# Patient Record
Sex: MALE | Race: WHITE | NOT HISPANIC OR LATINO | Employment: UNEMPLOYED | URBAN - NONMETROPOLITAN AREA
[De-identification: names, ages, dates, MRNs, and addresses within clinical notes are randomized per-mention and may not be internally consistent; named-entity substitution may affect disease eponyms.]

---

## 2017-07-25 ENCOUNTER — HOSPITAL ENCOUNTER (INPATIENT)
Facility: HOSPITAL | Age: 32
LOS: 1 days | Discharge: HOME | End: 2017-07-26
Attending: NEUROLOGICAL SURGERY | Admitting: NEUROLOGICAL SURGERY

## 2017-07-25 ENCOUNTER — APPOINTMENT (OUTPATIENT)
Dept: CT IMAGING | Facility: HOSPITAL | Age: 32
End: 2017-07-25

## 2017-07-25 DIAGNOSIS — I60.9 SUBARACHNOID BLEED (HCC): ICD-10-CM

## 2017-07-25 LAB
APTT PPP: 27.6 SECONDS (ref 24.1–34.8)
BACTERIA UR QL AUTO: ABNORMAL /HPF
BILIRUB UR QL STRIP: NEGATIVE
CLARITY UR: CLEAR
COLOR UR: YELLOW
GLUCOSE UR STRIP-MCNC: NEGATIVE MG/DL
HGB UR QL STRIP.AUTO: NEGATIVE
HYALINE CASTS UR QL AUTO: ABNORMAL /LPF
INR PPP: 0.92 (ref 0.91–1.09)
KETONES UR QL STRIP: NEGATIVE
LEUKOCYTE ESTERASE UR QL STRIP.AUTO: ABNORMAL
NITRITE UR QL STRIP: NEGATIVE
PH UR STRIP.AUTO: 5.5 [PH] (ref 5–8)
PROT UR QL STRIP: NEGATIVE
PROTHROMBIN TIME: 12.6 SECONDS (ref 11.9–14.6)
RBC # UR: ABNORMAL /HPF
REF LAB TEST METHOD: ABNORMAL
SP GR UR STRIP: 1.02 (ref 1–1.03)
SQUAMOUS #/AREA URNS HPF: ABNORMAL /HPF
UROBILINOGEN UR QL STRIP: ABNORMAL
WBC UR QL AUTO: ABNORMAL /HPF
YEAST URNS QL MICRO: ABNORMAL /HPF

## 2017-07-25 PROCEDURE — 81001 URINALYSIS AUTO W/SCOPE: CPT | Performed by: NEUROLOGICAL SURGERY

## 2017-07-25 PROCEDURE — G8980 MOBILITY D/C STATUS: HCPCS | Performed by: PHYSICAL THERAPIST

## 2017-07-25 PROCEDURE — 97165 OT EVAL LOW COMPLEX 30 MIN: CPT

## 2017-07-25 PROCEDURE — G8988 SELF CARE GOAL STATUS: HCPCS

## 2017-07-25 PROCEDURE — 85730 THROMBOPLASTIN TIME PARTIAL: CPT | Performed by: NEUROLOGICAL SURGERY

## 2017-07-25 PROCEDURE — G8978 MOBILITY CURRENT STATUS: HCPCS | Performed by: PHYSICAL THERAPIST

## 2017-07-25 PROCEDURE — 99255 IP/OBS CONSLTJ NEW/EST HI 80: CPT | Performed by: PSYCHIATRY & NEUROLOGY

## 2017-07-25 PROCEDURE — 81003 URINALYSIS AUTO W/O SCOPE: CPT | Performed by: NEUROLOGICAL SURGERY

## 2017-07-25 PROCEDURE — 25010000002 DIHYDROERGOTAMINE MESYLATE PER 1 MG: Performed by: PSYCHIATRY & NEUROLOGY

## 2017-07-25 PROCEDURE — 25010000002 ONDANSETRON PER 1 MG: Performed by: PSYCHIATRY & NEUROLOGY

## 2017-07-25 PROCEDURE — 0 IOPAMIDOL PER 1 ML: Performed by: NEUROLOGICAL SURGERY

## 2017-07-25 PROCEDURE — 87086 URINE CULTURE/COLONY COUNT: CPT | Performed by: NEUROLOGICAL SURGERY

## 2017-07-25 PROCEDURE — 70496 CT ANGIOGRAPHY HEAD: CPT

## 2017-07-25 PROCEDURE — G8987 SELF CARE CURRENT STATUS: HCPCS

## 2017-07-25 PROCEDURE — 85610 PROTHROMBIN TIME: CPT | Performed by: NEUROLOGICAL SURGERY

## 2017-07-25 PROCEDURE — G8989 SELF CARE D/C STATUS: HCPCS

## 2017-07-25 PROCEDURE — G8979 MOBILITY GOAL STATUS: HCPCS | Performed by: PHYSICAL THERAPIST

## 2017-07-25 PROCEDURE — 99223 1ST HOSP IP/OBS HIGH 75: CPT | Performed by: NEUROLOGICAL SURGERY

## 2017-07-25 PROCEDURE — 97161 PT EVAL LOW COMPLEX 20 MIN: CPT

## 2017-07-25 PROCEDURE — 25010000002 HYDROMORPHONE PER 4 MG: Performed by: NEUROLOGICAL SURGERY

## 2017-07-25 RX ORDER — TRAMADOL HYDROCHLORIDE 50 MG/1
50 TABLET ORAL EVERY 6 HOURS PRN
Status: DISCONTINUED | OUTPATIENT
Start: 2017-07-25 | End: 2017-07-26 | Stop reason: HOSPADM

## 2017-07-25 RX ORDER — BISACODYL 10 MG
10 SUPPOSITORY, RECTAL RECTAL DAILY PRN
Status: DISCONTINUED | OUTPATIENT
Start: 2017-07-25 | End: 2017-07-26 | Stop reason: HOSPADM

## 2017-07-25 RX ORDER — SODIUM CHLORIDE 9 MG/ML
75 INJECTION, SOLUTION INTRAVENOUS CONTINUOUS
Status: DISCONTINUED | OUTPATIENT
Start: 2017-07-25 | End: 2017-07-26 | Stop reason: HOSPADM

## 2017-07-25 RX ORDER — ACETAMINOPHEN 650 MG/1
650 SUPPOSITORY RECTAL EVERY 4 HOURS PRN
Status: DISCONTINUED | OUTPATIENT
Start: 2017-07-25 | End: 2017-07-26 | Stop reason: HOSPADM

## 2017-07-25 RX ORDER — SODIUM CHLORIDE 0.9 % (FLUSH) 0.9 %
1-10 SYRINGE (ML) INJECTION AS NEEDED
Status: DISCONTINUED | OUTPATIENT
Start: 2017-07-25 | End: 2017-07-26 | Stop reason: HOSPADM

## 2017-07-25 RX ORDER — SENNA AND DOCUSATE SODIUM 50; 8.6 MG/1; MG/1
2 TABLET, FILM COATED ORAL 2 TIMES DAILY
Status: DISCONTINUED | OUTPATIENT
Start: 2017-07-25 | End: 2017-07-26 | Stop reason: HOSPADM

## 2017-07-25 RX ORDER — ONDANSETRON 2 MG/ML
4 INJECTION INTRAMUSCULAR; INTRAVENOUS EVERY 8 HOURS
Status: DISCONTINUED | OUTPATIENT
Start: 2017-07-25 | End: 2017-07-26 | Stop reason: HOSPADM

## 2017-07-25 RX ORDER — ONDANSETRON 2 MG/ML
4 INJECTION INTRAMUSCULAR; INTRAVENOUS EVERY 6 HOURS PRN
Status: DISCONTINUED | OUTPATIENT
Start: 2017-07-25 | End: 2017-07-26 | Stop reason: HOSPADM

## 2017-07-25 RX ORDER — NALOXONE HCL 0.4 MG/ML
0.4 VIAL (ML) INJECTION
Status: DISCONTINUED | OUTPATIENT
Start: 2017-07-25 | End: 2017-07-26 | Stop reason: HOSPADM

## 2017-07-25 RX ORDER — HYDROMORPHONE HCL 110MG/55ML
2 PATIENT CONTROLLED ANALGESIA SYRINGE INTRAVENOUS ONCE
Status: COMPLETED | OUTPATIENT
Start: 2017-07-25 | End: 2017-07-25

## 2017-07-25 RX ORDER — SODIUM CHLORIDE 9 MG/ML
125 INJECTION, SOLUTION INTRAVENOUS CONTINUOUS
Status: DISCONTINUED | OUTPATIENT
Start: 2017-07-25 | End: 2017-07-26 | Stop reason: HOSPADM

## 2017-07-25 RX ORDER — KETOROLAC TROMETHAMINE 30 MG/ML
30 INJECTION, SOLUTION INTRAMUSCULAR; INTRAVENOUS EVERY 6 HOURS PRN
Status: DISCONTINUED | OUTPATIENT
Start: 2017-07-25 | End: 2017-07-26 | Stop reason: HOSPADM

## 2017-07-25 RX ADMIN — IOPAMIDOL 150 ML: 755 INJECTION, SOLUTION INTRAVENOUS at 09:00

## 2017-07-25 RX ADMIN — ONDANSETRON 4 MG: 2 INJECTION INTRAMUSCULAR; INTRAVENOUS at 11:55

## 2017-07-25 RX ADMIN — SODIUM CHLORIDE 125 ML/HR: 9 INJECTION, SOLUTION INTRAVENOUS at 11:47

## 2017-07-25 RX ADMIN — HYDROMORPHONE HYDROCHLORIDE 1 MG: 1 INJECTION, SOLUTION INTRAMUSCULAR; INTRAVENOUS; SUBCUTANEOUS at 14:25

## 2017-07-25 RX ADMIN — SODIUM CHLORIDE 125 ML/HR: 9 INJECTION, SOLUTION INTRAVENOUS at 15:37

## 2017-07-25 RX ADMIN — HYDROMORPHONE HYDROCHLORIDE 1 MG: 1 INJECTION, SOLUTION INTRAMUSCULAR; INTRAVENOUS; SUBCUTANEOUS at 11:56

## 2017-07-25 RX ADMIN — DIHYDROERGOTAMINE MESYLATE 0.5 MG: 1 INJECTION, SOLUTION INTRAMUSCULAR; INTRAVENOUS; SUBCUTANEOUS at 11:55

## 2017-07-25 RX ADMIN — HYDROMORPHONE HYDROCHLORIDE 1 MG: 1 INJECTION, SOLUTION INTRAMUSCULAR; INTRAVENOUS; SUBCUTANEOUS at 20:51

## 2017-07-25 RX ADMIN — HYDROMORPHONE HYDROCHLORIDE 2 MG: 2 INJECTION, SOLUTION INTRAMUSCULAR; INTRAVENOUS; SUBCUTANEOUS at 07:23

## 2017-07-25 RX ADMIN — ONDANSETRON 4 MG: 2 INJECTION INTRAMUSCULAR; INTRAVENOUS at 20:51

## 2017-07-25 RX ADMIN — DOCUSATE SODIUM -SENNOSIDES 2 TABLET: 50; 8.6 TABLET, COATED ORAL at 17:53

## 2017-07-25 RX ADMIN — HYDROMORPHONE HYDROCHLORIDE 1 MG: 1 INJECTION, SOLUTION INTRAMUSCULAR; INTRAVENOUS; SUBCUTANEOUS at 16:26

## 2017-07-25 RX ADMIN — HYDROMORPHONE HYDROCHLORIDE 1 MG: 1 INJECTION, SOLUTION INTRAMUSCULAR; INTRAVENOUS; SUBCUTANEOUS at 10:01

## 2017-07-25 NOTE — PLAN OF CARE
Problem: Patient Care Overview (Adult)  Goal: Plan of Care Review  Outcome: Ongoing (interventions implemented as appropriate)    07/25/17 1411   Coping/Psychosocial Response Interventions   Plan Of Care Reviewed With patient   Patient Care Overview   Progress progress toward functional goals as expected   Outcome Evaluation   Outcome Summary/Follow up Plan OT eval completed. Pt. cond I for bed mobility. Pt. SBA for sit to stand t/f and functional mobility. Pt. independent with donning socks. Pt. reports h/a is worsening with light and activity. Pt. does not require skilled OT at this time d/t being at baseline other than severe headache. 7/25/17 1346

## 2017-07-25 NOTE — PROGRESS NOTES
Discharge Planning Assessment  [facility]     Patient Name: Lior Piper  MRN: [id number]  Today's Date: 7/25/2017    Admit Date: 7/25/2017          Discharge Needs Assessment       07/25/17 1038    Living Environment    Lives With spouse    Living Arrangements apartment    Transportation Available car;family or friend will provide    Living Environment    Provides Primary Care For no one    Quality Of Family Relationships supportive    Able to Return to Prior Living Arrangements yes    Discharge Needs Assessment    Concerns To Be Addressed financial/insurance concerns    Readmission Within The Last 30 Days no previous admission in last 30 days    Anticipated Changes Related to Illness none    Equipment Currently Used at Home none    Equipment Needed After Discharge none    Current Discharge Risk financial support inadequate    Discharge Planning Comments Patient admitted from home where he resides with his spouse.  Patient is independent and works.  Patient does not have medical coverage, prescription coverage or a PCP.  Patient states he has had no chronic health issues.  Patient prefers to take information on [facility] with him and would not like an appointment scheduled by hospital at this time for follow up.  Patient states he will pursue PCP follow up independently.  Patient may require assistance with discharge medications.  Did inform patient that [facility] will be screening him if they haven't already and if he does not qualify for any state insurance programs to utilize the hospital's alisha program.            Discharge Plan     None        Discharge Placement     No information found                Demographic Summary     None            Functional Status     None            Psychosocial     None            Abuse/Neglect     None            Legal     None            Substance Abuse     None            Patient Forms     None          GRIFFIN Yuen

## 2017-07-25 NOTE — H&P
Date of Admission: 7/25/2017  Primary Care Physician: No Known Provider        Subjective:    Chief complaint headache    HPI: 33 yo male with a three day history of headache, became severe Monday morning. Photophobia, nausea, no vomiting. Some neck stiffness. No vision changes.  Patient normally does not have headaches.  He has never sought medical attention for headaches in the past.    Review of Systems   Neurological: Positive for headaches.   All other systems reviewed and are negative.       Past Medical History: none  Past Surgical History: none  Family History: non contributory  Social History: Full ADL, no smoking, no EtOH, working as a supervisor  Code Status: Full    Medications:  No prescriptions prior to admission.       Allergies:  Allergies   Allergen Reactions   • Chocolate Other (See Comments)       Objective:  Physical Exam   Constitutional: He is oriented to person, place, and time. He appears well-developed.   Cardiovascular: Normal rate and regular rhythm.    Pulmonary/Chest: Effort normal. No respiratory distress.   Abdominal: Soft. He exhibits no distension.   Neurological: He is oriented to person, place, and time. He has normal strength. He has a normal Finger-Nose-Finger Test.   Psychiatric: His speech is normal.       Neurologic Exam     Mental Status   Oriented to person, place, and time.   Attention: normal.   Speech: speech is normal   Level of consciousness: alert  Knowledge: good.     Cranial Nerves   Cranial nerves II through XII intact.     Motor Exam   Muscle bulk: normal  Overall muscle tone: normal  Right arm pronator drift: absent  Left arm pronator drift: absent    Strength   Strength 5/5 throughout.     Sensory Exam   Pinprick normal.     Gait, Coordination, and Reflexes     Coordination   Finger to nose coordination: normal    Tremor   Resting tremor: absent  Intention tremor: absent  Action tremor: absent      Vital Signs  Temp:  [96.9 °F (36.1 °C)] 96.9 °F (36.1 °C)  Heart  Rate:  [58-61] 61  Resp:  [17-18] 17  BP: (149-174)/() 149/95        Results Review:  I reviewed the patient's new clinical results.  I reviewed the patient's new imaging results and agree with the interpretation.  I reviewed the patient's other test results and agree with the interpretation         Lab Results (last 24 hours)     ** No results found for the last 24 hours. **          Imaging Results (last 24 hours)     ** No results found for the last 24 hours. **                 Assessment/Plan:   CT scan of the brain without contrast was negative for subarachnoid hemorrhage.  Lumbar puncture at the outside hospital did show 106 red cells to 1 white protein was slightly elevated at 63 glucose was normal.  The patient's history is concerning for occult subarachnoid hemorrhage we have admitted and taken to intensive care unit for observation until a appropriate vascular study became be completed.  We will treat his pain and keep his blood pressure less than 160 systolic and get a stat CT angiogram of the brain to look for a vascular abnormality.    I discussed the patients findings and my recommendations with patient and family    Jeancarlos Min MD  07/25/17  7:22 AM    Time: More than 50% of time spent in counseling and coordination of care:  Total face-to-face/floor time 45 min.  Time spent in counseling 20 min. Counseling included the following topics: Diagnosis, condition, treatment, and plan

## 2017-07-25 NOTE — PLAN OF CARE
Problem: Patient Care Overview (Adult)  Goal: Plan of Care Review  Outcome: Ongoing (interventions implemented as appropriate)    07/25/17 1612   Coping/Psychosocial Response Interventions   Plan Of Care Reviewed With patient   Patient Care Overview   Progress improving       Goal: Adult Individualization and Mutuality  Outcome: Ongoing (interventions implemented as appropriate)    07/25/17 1612   Individualization   Patient Specific Goals pain control       Goal: Discharge Needs Assessment  Outcome: Ongoing (interventions implemented as appropriate)    Problem: Pain, Acute (Adult)  Goal: Identify Related Risk Factors and Signs and Symptoms  Outcome: Ongoing (interventions implemented as appropriate)    07/25/17 1612   Pain, Acute   Related Risk Factors (Acute Pain) disease process;persistent pain   Signs and Symptoms (Acute Pain) verbalization of pain descriptors       Goal: Acceptable Pain Control/Comfort Level  Outcome: Ongoing (interventions implemented as appropriate)    07/25/17 1612   Pain, Acute (Adult)   Acceptable Pain Control/Comfort Level making progress toward outcome

## 2017-07-25 NOTE — PLAN OF CARE
Problem: Patient Care Overview (Adult)  Goal: Plan of Care Review  Outcome: Outcome(s) achieved Date Met:  07/25/17 07/25/17 0929   Coping/Psychosocial Response Interventions   Plan Of Care Reviewed With patient;spouse   Patient Care Overview   Progress progress toward functional goals as expected   Outcome Evaluation   Outcome Summary/Follow up Plan Physical therapy evaluation completed. Pt was conditional independent with bed mobility and transfers and supervision for gait. Pt reports functionally he is at baseline and is limited by his HA that is worsened with light and activity. Pt will not benefit from skilled therapy at this time. Therapy anticipates d/c home with assist.

## 2017-07-25 NOTE — CONSULTS
"Neurology Consult Note    Referring Provider: Jeancarlos Min MD  Reason for Consultation: migraine      History of present illness:      32-year-old  male with no significant history of migraines.  He began having a headache 2 days ago.  He describes the headache as bilateral and retro-orbital in nature with bilateral occipital components as well.  He does have associated nausea, vomiting, light sensitivity, and sound sensitivity.  He describes a severely currently as 10 out of 10.  He denies recent history of head trauma.  He describes a subacute onset as opposed to an acute onset.  He denies stiff neck.  He denies fevers and chills.  He has no recent medication changes.  He does do somewhat intensive workouts and uses supplemental testosterone just before his workouts but has been doing this for approximately 2 months.  He has no family history of cerebral aneurysms.  He initially presented to [facility].  A lumbar puncture there was a bloody tap and therefore they were concerned over the possibility of a subarachnoid hemorrhage.  He was transferred here where CT angiography of the head showed no evidence of cerebral aneurysms.  History reviewed. No pertinent past medical history.    Allergies   Allergen Reactions   • Chocolate Other (See Comments)     No current facility-administered medications on file prior to encounter.      No current outpatient prescriptions on file prior to encounter.       Social History     Social History   • Marital status:      Spouse name: N/A   • Number of children: N/A   • Years of education: N/A     Occupational History   • Not on file.     Social History Main Topics   • Smoking status: Never Smoker   • Smokeless tobacco: Current User     Types: Chew   • Alcohol use 0.6 oz/week     1 Shots of liquor per week      Comment: \"one coctail a day\"   • Drug use: Defer   • Sexual activity: Defer     Other Topics Concern   • Not on file     Social History " Narrative   • No narrative on file     Family History   Problem Relation Age of Onset   • Hypertension Mother    • COPD Mother    • Hypertension Father    • Diabetes Brother        Review of Systems  A 14 point review of systems was reviewed and was negative except for Severe head pain    Vital Signs   Temp:  [96.9 °F (36.1 °C)-97.5 °F (36.4 °C)] 97.5 °F (36.4 °C)  Heart Rate:  [56-71] 56  Resp:  [17-18] 17  BP: (144-174)/() 155/92    General Exam:  Head:  Normal cephalic, atraumatic  HEENT:  Neck supple  Fundoscopic Exam:  No signs of disc edema  CVS:  Regular rate and rhythm.  No murmurs  Carotid Examination:  No bruits  Lungs:  Clear to auscultation  Abdomen:  Non-tender, Non-distended  Extremities:  No signs of peripheral edema  Skin:  No rashes    Neurologic Exam:    Mental Status:    -Awake, Alert, Oriented X 3  -No word finding difficulties  -No aphasia  -No dysarthria  -Follows simple and complex commands    CN II:  Visual fields full.  Pupils equally reactive to light  CN III, IV, VI:  Extraocular Muscles full with no signs of nystagmus  CN V:  Facial sensory is symmetric with no asymetries.  CN VII:  Facial motor symmetric  CN VIII:  Gross hearing intact bilaterally  CN IX:  Palate elevates symmetrically  CN X:  Palate elevates symmetrically  CN XI:  Shoulder shrug symmetric  CN XII:  Tongue is midline on protrusion    Motor: (strength out of 5:  1= minimal movement, 2 = movement in plane of gravity, 3 = movement against gravity, 4 = movement against some resistance, 5 = full strength)    -Right Upper Ext: Proximal: 5 Distal: 5  -Left Upper Ext: Proximal: 5 Distal: 5    -Right Lower Ext: Proximal: 5 Distal: 5  -Left Lower Ext: Proximal: 5 Distal: 5    DTR:  -Right   Bicep: 2+ Tricep: 2+ Brachoradialis: 2+   Patella: 2+ Ankle: 2+ Neg Babinski  -Left   Bicep: 2+ Tricep: 2+ Brachoradialis: 2+   Patella: 2+ Ankle: 2+ Neg Babinski    Sensory:  -Intact to light touch, pinprick, temperature, pain, and  proprioception    Coordination:  -Finger to nose intact  -Heel to shin intact  -No ataxia    Gait  -No signs of ataxia  -ambulates unassisted      Results Review:  Lab Results (last 24 hours)     Procedure Component Value Units Date/Time    aPTT [[id number]] Collected:  07/25/17 1110    Specimen:  Blood Updated:  07/25/17 1113    Protime-INR [[id number]] Collected:  07/25/17 1110    Specimen:  Blood Updated:  07/25/17 1113          .  Imaging Results (last 24 hours)     Procedure Component Value Units Date/Time    CT Angiogram Head With & Without Contrast [[id number]] Updated:  07/25/17 0817      CTA of the head reviewed by me showing no signs of vascular abnormalities.  Specifically there was no evidence of aneurysms.        Impression    Status migrainosus    Plan    --DHE protocol  --Zofran to occur me DHE  --Normal saline going at 125 mL an hour  --Will continue to monitor patient    I discussed the patients findings and my recommendations with patient and family    Ever Nolan MD  07/25/17  11:14 AM

## 2017-07-25 NOTE — THERAPY DISCHARGE NOTE
Acute Care - Physical Therapy Initial Eval/Discharge  [facility]     Patient Name: Lior Piper  : 1985  MRN: [id number]  Today's Date: 2017   Onset of Illness/Injury or Date of Surgery Date: 17  Date of Referral to PT: 17  Referring Physician: Dr. Min      Admit Date: 2017    Visit Dx:    ICD-10-CM ICD-9-CM   1. Subarachnoid bleed I60.9 430     Patient Active Problem List   Diagnosis   • Subarachnoid bleed     History reviewed. No pertinent past medical history.  History reviewed. No pertinent surgical history.       PT ASSESSMENT (last 72 hours)      PT Evaluation       17 1314 17 1310    Rehab Evaluation    Document Type evaluation  -MS (r) RZ (t) MS (c) evaluation   See MAR  -ND    Subjective Information agree to therapy;complains of;fatigue;pain;nausea/vomiting  -MS (r) RZ (t) MS (c) agree to therapy;complains of;weakness;pain;fatigue;nausea/vomiting  -ND    General Information    Patient Profile Review yes  -MS (r) RZ (t) MS (c) yes  -ND    Onset of Illness/Injury or Date of Surgery Date 17  -MS (r) RZ (t) MS (c) 17  -ND    Referring Physician Dr. Min  -MS (r) RZ (t) MS (c) Dr. Min  -ND    General Observations Pt in flowers, OT present, IV intact, telemetry intact  -MS (r) RZ (t) MS (c) fowlers, alert, IV  -ND    Pertinent History Of Current Problem Pt has hx of 3 day long HA with noted photophobia, phonophobia and nausea and vomiting. Lumbar puncture at [facility]. was bloody and he was tx to  for possible SAH. CT: Negative for SAH. Dx: Status Migrainosus  -MS (r) RZ (t) MS (c) Pt. has has 3 day hx of severe headache with photophobia, n/v. Labs therapeutic.   -ND    Precautions/Limitations fall precautions  -MS (r) RZ (t) MS (c) fall precautions  -ND    Prior Level of Function independent:;all household mobility;community mobility;gait;transfer;ADL's;driving  -MS (r) RZ (t) MS (c) independent:;all household mobility;community  mobility;ADL's;driving  -ND    Equipment Currently Used at Home none  -MS (r) RZ (t) MS (c) none  -ND    Plans/Goals Discussed With patient and family;agreed upon  -MS (r) RZ (t) MS (c) patient;agreed upon  -ND    Risks Reviewed patient and family:;LOB;nausea/vomiting;dizziness;increased discomfort;change in vital signs  -MS (r) RZ (t) MS (c) patient:;LOB;nausea/vomiting;increased discomfort;dizziness;change in vital signs;increased drainage;lines disloged  -ND    Benefits Reviewed patient:;spouse/S.O.:;increase independence;improve function;increase balance;decrease pain;increase strength;increase knowledge  -MS (r) RZ (t) MS (c) patient:;improve function;increase independence;increase balance;increase strength;decrease pain;decrease risk of DVT;improve skin integrity;increase knowledge  -ND    Barriers to Rehab none identified  -MS (r) RZ (t) MS (c) none identified  -ND    Living Environment    Lives With spouse  -MS (r) RZ (t) MS (c) spouse  -ND    Living Arrangements apartment  -MS (r) RZ (t) MS (c) apartment  -ND    Home Accessibility bed and bath on same level;tub/shower is not walk in  -MS (r) RZ (t) MS (c) bed and bath on same level;tub/shower is not walk in  -ND    Stair Railings at Home none  -MS (r) RZ (t) MS (c) none  -ND    Clinical Impression    Date of Referral to PT 07/24/17  -MS (r) RZ (t) MS (c)     PT Diagnosis Pt presents with severe HA and slightly impaired balance secondary to pain and photophobia.  -MS (r) RZ (t) MS (c)     Prognosis Good  -MS (r) RZ (t) MS (c)     Criteria for Skilled Therapeutic Interventions Met no;current level of function same as previous level of function  -MS (r) RZ (t) MS (c)     Pathology/Pathophysiology Noted (Describe Specifically for Each System) neuromuscular  -MS (r) RZ (t) MS (c)     Impairments Found (describe specific impairments) gait, locomotion, and balance  -MS (r) RZ (t) MS (c)     Rehab Potential other (see comments)   eval only  -MS (r) RZ (t) MS (c)      Predicted Duration of Therapy Intervention (days/wks) eval only   -MS (r) RZ (t) MS (c)     Pain Assessment    Pain Assessment 0-10  -MS (r) RZ (t) MS (c) 0-10  -ND    Pain Score 8  -MS (r) RZ (t) MS (c) 8  -ND    Pain Type Acute pain  -MS (r) RZ (t) MS (c) Acute pain  -ND    Pain Location Head  -MS (r) RZ (t) MS (c) Head  -ND    Pain Descriptors Aching;Throbbing  -MS (r) RZ (t) MS (c) Aching;Throbbing  -ND    Pain Frequency Constant/continuous  -MS (r) RZ (t) MS (c) Constant/continuous  -ND    Pain Intervention(s) Medication (See MAR);Ambulation/increased activity;Repositioned  -MS (r) RZ (t) MS (c) Medication (See MAR);Repositioned  -ND    Response to Interventions Increased HA with activity   -MS (r) RZ (t) MS (c) tolerated  -ND    Vision Assessment/Intervention    Visual Impairment WFL with corrective lenses   pt does not have contacts with him  -MS (r) RZ (t) MS (c) WFL with corrective lenses  -ND    Cognitive Assessment/Intervention    Current Cognitive/Communication Assessment functional  -MS (r) RZ (t) MS (c) functional  -ND    Orientation Status oriented x 4  -MS (r) RZ (t) MS (c) oriented x 4  -ND    Follows Commands/Answers Questions 100% of the time;able to follow multi-step instructions;able to follow single-step instructions  -MS (r) RZ (t) MS (c) 100% of the time;able to follow multi-step instructions  -ND    Personal Safety WNL/WFL;good awareness, safety precautions  -MS (r) RZ (t) MS (c) WNL/WFL  -ND    ROM (Range of Motion)    General ROM no range of motion deficits identified  -MS (r) RZ (t) MS (c) no range of motion deficits identified  -ND    MMT (Manual Muscle Testing)    General MMT Assessment no strength deficits identified  -MS (r) RZ (t) MS (c) no strength deficits identified  -ND    Bed Mobility, Assessment/Treatment    Bed Mobility, Assistive Device bed rails;head of bed elevated  -MS (r) RZ (t) MS (c) bed rails;head of bed elevated  -ND    Bed Mobility, Roll Right, [place]  conditional independence  -MS (r) RZ (t) MS (c) conditional independence  -ND    Bed Mobility, Scoot/Bridge, [place] conditional independence  -MS (r) RZ (t) MS (c) conditional independence  -ND    Bed Mob, Supine to Sit, [place] conditional independence  -MS (r) RZ (t) MS (c) conditional independence  -ND    Bed Mob, Sit to Supine, [place] conditional independence  -MS (r) RZ (t) MS (c) conditional independence  -ND    Bed Mobility, Impairments pain  -MS (r) RZ (t) MS (c) pain  -ND    Transfer Assessment/Treatment    Transfers, Sit-Stand [place] stand by assist  -MS (r) RZ (t) MS (c) stand by assist  -ND    Transfers, Stand-Sit [place] stand by assist  -MS (r) RZ (t) MS (c) stand by assist  -ND    Transfer, Impairments pain  -MS (r) RZ (t) MS (c)     Gait Assessment/Treatment    Gait, [place] Level supervision required  -MS (r) RZ (t) MS (c)     Gait, Distance (Feet) 150  -MS (r) RZ (t) MS (c)     Gait, Gait Pattern Analysis swing-through gait  -MS (r) RZ (t) MS (c)     Gait, Gait Deviations right:;left:;chuy decreased;decreased heel strike;double stance time increased  -MS (r) RZ (t) MS (c)     Gait, Safety Issues step length decreased  -MS (r) RZ (t) MS (c)     Gait, Impairments pain;impaired balance  -MS (r) RZ (t) MS (c)     Gait, Comment Pt ambulates with squinted eyes in hallway, pts distance for gait limited secondary to increased HA  from the [place] lights  -MS (r) RZ (t) MS (c)     Motor Skills/Interventions    Additional Documentation Balance Skills Training (Group);Gross Motor Coordination Training (Group)  -MS (r) RZ (t) MS (c) Balance Skills Training (Group)  -ND    Balance Skills Training    Sitting-Level of Assistance Independent  -MS (r) RZ (t) MS (c) Independent  -ND    Sitting-Balance Support Feet supported  -MS (r) RZ (t) MS (c) Feet supported  -ND    Sitting-Balance Activities Forward lean;Reaching for objects;Reaching across midline  -MS (r) RZ (t) MS (c)      Standing-Level of Assistance Distant supervision;Close supervision  -MS (r) RZ (t) MS (c) Distant supervision  -ND    Static Standing Balance Support No upper extremity supported  -MS (r) RZ (t) MS (c) No upper extremity supported  -ND    Gait Balance-Level of Assistance Close supervision  -MS (r) RZ (t) MS (c) Close supervision  -ND    Gait Balance Support No upper extremity supported  -MS (r) RZ (t) MS (c) No upper extremity supported  -ND    Gross Motor Coordination Training    Gross Motor Skill, Impairments Detail B LE MICHAEL intact  -MS (r) RZ (t) MS (c)     Sensory Assessment/Intervention    Sensory Impairment --   B LE LT intact and symmetrical   -MS (r) RZ (t) MS (c) --   WNL  -ND    Positioning and Restraints    Pre-Treatment Position in bed  -MS (r) RZ (t) MS (c) in bed  -ND    Post Treatment Position bed  -MS (r) RZ (t) MS (c) bed  -ND    In Bed fowlers;call light within reach;encouraged to call for assist;with family/caregiver;side rails up x2  -MS (r) RZ (t) MS (c) fowlers;call light within reach;encouraged to call for assist;with family/caregiver;side rails up x2  -ND      07/25/17 1038 07/25/17 1000    General Information    Equipment Currently Used at Home none  -CE     Living Environment    Lives With spouse  -CE spouse  -SL    Living Arrangements apartment  - house  -    Home Accessibility  no concerns  -    Stair Railings at Home  none  -SL    Type of Financial/Environmental Concern  none  -SL    Transportation Available car;family or friend will provide  -CE car  -SL      07/25/17 0828       General Information    Equipment Currently Used at Home none  -SL       User Key  (r) = Recorded By, (t) = Taken By, (c) = Cosigned By    Initials Name Provider Type    MS Edda Sethi, PT DPT Physical Therapist    SL Rebecca Easton, GIOVANNI Registered Nurse    RAFAELA Chávez, BSW     CONNIE Brock, OTR/L Occupational Therapist    SUSAN Cherry, PT Student PT Student           Physical Therapy Education     Title: PT OT SLP Therapies (Resolved)     Topic: Physical Therapy (Resolved)     Point: Mobility training (Resolved)    Learning Progress Summary    Learner Readiness Method Response Comment Documented by Status   Patient Acceptance E VU Pt educated not to perform functional activity without assitance. Pt educated to call for assist prior to ambulation. Pt educated in mobility training.  07/25/17 1358 Done   Significant Other Acceptance E VU Pt educated not to perform functional activity without assitance. Pt educated to call for assist prior to ambulation. Pt educated in mobility training.  07/25/17 1358 Done               Point: Precautions (Resolved)    Learning Progress Summary    Learner Readiness Method Response Comment Documented by Status   Patient Acceptance E VU Pt educated not to perform functional activity without assitance. Pt educated to call for assist prior to ambulation. Pt educated in mobility training.  07/25/17 1358 Done   Significant Other Acceptance E VU Pt educated not to perform functional activity without assitance. Pt educated to call for assist prior to ambulation. Pt educated in mobility training.  07/25/17 1358 Done                      User Key     Initials Effective Dates Name Provider Type Discipline     05/08/17 -  Josefina Cherry, PT Student PT Student PT                PT Recommendation and Plan  Anticipated Discharge Disposition: home with assist  Planned Therapy Interventions: other (see comments) (eval only)  PT Frequency: evaluation only  Plan of Care Review  Plan Of Care Reviewed With: patient, spouse  Progress: progress toward functional goals as expected  Outcome Summary/Follow up Plan: Physical therapy evaluation completed. Pt was conditional independent with bed mobility and transfers and supervision for gait. Pt reports functionally he is at baseline and is limited by his HA that is worsened with light and activity.  Pt will  not benefit from skilled therapy at this time. Therapy anticipates d/c home with assist.               Outcome Measures       07/25/17 1400 07/25/17 1314       How much help from another person do you currently need...    Turning from your back to your side while in flat bed without using bedrails?  4  -MS (r) RZ (t) MS (c)     Moving from lying on back to sitting on the side of a flat bed without bedrails?  4  -MS (r) RZ (t) MS (c)     Moving to and from a bed to a chair (including a wheelchair)?  4  -MS (r) RZ (t) MS (c)     Standing up from a chair using your arms (e.g., wheelchair, bedside chair)?  4  -MS (r) RZ (t) MS (c)     Climbing 3-5 steps with a railing?  3  -MS (r) RZ (t) MS (c)     To walk in hospital room?  4  -MS (r) RZ (t) MS (c)     AM-PAC 6 Clicks Score  23  -MS (r) RZ (t)     How much help from another is currently needed...    Putting on and taking off regular lower body clothing? 4  -ND      Bathing (including washing, rinsing, and drying) 3  -ND      Toileting (which includes using toilet bed pan or urinal) 4  -ND      Putting on and taking off regular upper body clothing 4  -ND      Taking care of personal grooming (such as brushing teeth) 4  -ND      Eating meals 4  -ND      Score 23  -ND      Functional Assessment    Outcome Measure Options AM-PAC 6 Clicks Daily Activity (OT)  -ND AM-PAC 6 Clicks Basic Mobility (PT)  -MS (r) RZ (t) MS (c)       User Key  (r) = Recorded By, (t) = Taken By, (c) = Cosigned By    Initials Name Provider Type    MS Edda Sethi, PT DPT Physical Therapist    CONNIE Brock, OTR/L Occupational Therapist    SUSAN Cherry, PT Student PT Student           Time Calculation:         PT Charges       07/25/17 8377          Time Calculation    Start Time 1314  -MS (r) RZ (t) MS (c)      Stop Time 1350  -MS (r) RZ (t) MS (c)      Time Calculation (min) 36 min  -MS (r) RZ (t)      PT Received On 07/25/17  -MS (r) RZ (t) MS (c)        User Key  (r) = Recorded  By, (t) = Taken By, (c) = Cosigned By    Initials Name Provider Type    MS Edda LOZANO Navdeep, PT DPT Physical Therapist    SUSAN Cherry, PT Student PT Student          Therapy Charges for Today     Code Description Service Date Service Provider Modifiers Qty    79261048836 HC PT EVAL LOW COMPLEXITY 2 7/25/2017 Josefina Cherry, PT Student GP 1          PT G-Codes  PT Professional Judgement Used?: Yes  Outcome Measure Options: AM-PAC 6 Clicks Daily Activity (OT)  Score: 23  Functional Limitation: Mobility: Walking and moving around  Mobility: Walking and Moving Around Current Status (): At least 1 percent but less than 20 percent impaired, limited or restricted  Mobility: Walking and Moving Around Goal Status (): At least 1 percent but less than 20 percent impaired, limited or restricted  Mobility: Walking and Moving Around Discharge Status (): At least 1 percent but less than 20 percent impaired, limited or restricted    PT Discharge Summary  Anticipated Discharge Disposition: home with assist  Reason for Discharge: At baseline function  Outcomes Achieved: Refer to plan of care for updates on goals achieved  Discharge Destination: Home with assist    Josefina Cherry, PT Student  7/25/2017

## 2017-07-25 NOTE — THERAPY DISCHARGE NOTE
Acute Care - Occupational Therapy Initial Eval/Discharge  [facility]     Patient Name: Lior Piper  : 1985  MRN: [id number]  Today's Date: 2017  Onset of Illness/Injury or Date of Surgery Date: (P) 17  Date of Referral to OT: 17  Referring Physician: (P) Dr. Min      Admit Date: 2017       ICD-10-CM ICD-9-CM   1. Subarachnoid bleed I60.9 430     Patient Active Problem List   Diagnosis   • Subarachnoid bleed     History reviewed. No pertinent past medical history.  History reviewed. No pertinent surgical history.       OT ASSESSMENT FLOWSHEET (last 72 hours)      OT Evaluation       17 1314 17 1310 17 1038 17 1000 17 0828    Rehab Evaluation    Document Type (P)  evaluation  -RZ evaluation   See MAR  -ND       Subjective Information (P)  agree to therapy;complains of;fatigue;pain;nausea/vomiting  -RZ agree to therapy;complains of;weakness;pain;fatigue;nausea/vomiting  -ND       General Information    Patient Profile Review (P)  yes  -RZ yes  -ND       Onset of Illness/Injury or Date of Surgery Date (P)  17  -RZ 17  -ND       Referring Physician (P)  Dr. Min  -RZ Dr. Min  -ND       General Observations (P)  Pt in flowers, OT present, IV intact, telemetry intact  -RZ fowlers, alert, IV  -ND       Pertinent History Of Current Problem (P)  Pt has hx of 3 day long HA with noted photophobia, phonophobia and nausea and vomiting. Lumbar puncture at [facility] was bloody and he was tx to  for possible SAH. CT: Negative for SAH. Dx: Status Migrainosus  -RZ Pt. has has 3 day hx of severe headache with photophobia, n/v. Labs therapeutic.   -ND       Precautions/Limitations (P)  fall precautions  -RZ fall precautions  -ND       Prior Level of Function (P)  independent:;all household mobility;community mobility;gait;transfer;ADL's;driving  -RZ independent:;all household mobility;community mobility;ADL's;driving  -ND        Equipment Currently Used at Home (P)  none  -RZ none  -ND none  -CE  none  -SL    Plans/Goals Discussed With (P)  patient and family;agreed upon  -RZ patient;agreed upon  -ND       Risks Reviewed (P)  patient and family:;LOB;nausea/vomiting;dizziness;increased discomfort;change in vital signs  -RZ patient:;LOB;nausea/vomiting;increased discomfort;dizziness;change in vital signs;increased drainage;lines disloged  -ND       Benefits Reviewed (P)  patient:;spouse/S.O.:;increase independence;improve function;increase balance;decrease pain;increase strength;increase knowledge  -RZ patient:;improve function;increase independence;increase balance;increase strength;decrease pain;decrease risk of DVT;improve skin integrity;increase knowledge  -ND       Barriers to Rehab (P)  none identified  -RZ none identified  -ND       Living Environment    Lives With (P)  spouse  -RZ spouse  -ND spouse  -CE spouse  -SL     Living Arrangements (P)  apartment  -RZ apartment  -ND apartment  -CE house  -SL     Home Accessibility (P)  bed and bath on same level;tub/shower is not walk in  -RZ bed and bath on same level;tub/shower is not walk in  -ND  no concerns  -SL     Stair Railings at Home (P)  none  -RZ none  -ND  none  -SL     Type of Financial/Environmental Concern    none  -SL     Transportation Available   car;family or friend will provide  -CE car  -SL     Clinical Impression    Date of Referral to OT  07/25/17  -ND       OT Diagnosis  decreased adl  -ND       Prognosis  good  -ND       Patient/Family Goals Statement  go home  -ND       Criteria for Skilled Therapeutic Interventions Met  no problems identified which require skilled intervention  -ND       Therapy Frequency  evaluation only  -ND       Anticipated Discharge Disposition  home with assist  -ND       Functional Level Prior    Ambulation     0-->independent  -SL    Transferring     0-->independent  -SL    Toileting     0-->independent  -SL    Bathing     0-->independent   -SL    Dressing     0-->independent  -SL    Eating     0-->independent  -SL    Communication     0-->understands/communicates without difficulty  -SL    Swallowing     0-->swallows foods/liquids without difficulty  -SL    Prior Functional Level Comment     NA  -SL    Pain Assessment    Pain Assessment (P)  0-10  -RZ 0-10  -ND       Pain Score (P)  8  -RZ 8  -ND       Pain Type (P)  Acute pain  -RZ Acute pain  -ND       Pain Location (P)  Head  -RZ Head  -ND       Pain Descriptors (P)  Aching;Throbbing  -RZ Aching;Throbbing  -ND       Pain Frequency (P)  Constant/continuous  -RZ Constant/continuous  -ND       Pain Intervention(s) (P)  Medication (See MAR);Ambulation/increased activity;Repositioned  -RZ Medication (See MAR);Repositioned  -ND       Response to Interventions (P)  Increased HA with activity   -RZ tolerated  -ND       Vision Assessment/Intervention    Visual Impairment (P)  WFL with corrective lenses   pt does not have contacts with him  -RZ WFL with corrective lenses  -ND       Cognitive Assessment/Intervention    Current Cognitive/Communication Assessment (P)  functional  -RZ functional  -ND       Orientation Status (P)  oriented x 4  -RZ oriented x 4  -ND       Follows Commands/Answers Questions (P)  100% of the time;able to follow multi-step instructions;able to follow single-step instructions  -% of the time;able to follow multi-step instructions  -ND       Personal Safety (P)  WNL/WFL;good awareness, safety precautions  -RZ WNL/WFL  -ND       ROM (Range of Motion)    General ROM (P)  no range of motion deficits identified  -RZ no range of motion deficits identified  -ND       MMT (Manual Muscle Testing)    General MMT Assessment (P)  no strength deficits identified  -RZ no strength deficits identified  -ND       Bed Mobility, Assessment/Treatment    Bed Mobility, Assistive Device (P)  bed rails;head of bed elevated  -RZ bed rails;head of bed elevated  -ND       Bed Mobility, Roll Right,  [place] (P)  conditional independence  -RZ conditional independence  -ND       Bed Mobility, Scoot/Bridge, [place] (P)  conditional independence  -RZ conditional independence  -ND       Bed Mob, Supine to Sit, [place] (P)  conditional independence  -RZ conditional independence  -ND       Bed Mob, Sit to Supine, [place] (P)  conditional independence  -RZ conditional independence  -ND       Bed Mobility, Impairments (P)  pain  -RZ pain  -ND       Transfer Assessment/Treatment    Transfers, Sit-Stand [place] (P)  stand by assist  -RZ stand by assist  -ND       Transfers, Stand-Sit [place] (P)  stand by assist  -RZ stand by assist  -ND       Transfer, Impairments (P)  pain  -RZ        Functional Mobility    Functional Mobility- Ind. Level  --   SBA  -ND       Functional Mobility-Distance (Feet)  --   in room in [place]  -ND       Functional Mobility- Comment  Nauseated and headache worse with lighting in halls   -ND       Lower Body Dressing Assessment/Training    LB Dressing Assess/Train, Clothing Type  doffing:;donning:;socks  -ND       LB Dressing Assess/Train, Position  edge of bed  -ND       LB Dressing Assess/Train, [place]  independent  -ND       Motor Skills/Interventions    Additional Documentation (P)  Balance Skills Training (Group);Gross Motor Coordination Training (Group)  -RZ Balance Skills Training (Group)  -ND       Balance Skills Training    Sitting-Level of Assistance (P)  Independent  -RZ Independent  -ND       Sitting-Balance Support (P)  Feet supported  -RZ Feet supported  -ND       Sitting-Balance Activities (P)  Forward lean;Reaching for objects;Reaching across midline  -RZ        Standing-Level of Assistance (P)  Distant supervision;Close supervision  -RZ Distant supervision  -ND       Static Standing Balance Support (P)  No upper extremity supported  -RZ No upper extremity supported  -ND       Gait Balance-Level of Assistance (P)  Close supervision  -RZ Close  supervision  -ND       Gait Balance Support (P)  No upper extremity supported  -RZ No upper extremity supported  -ND       Gross Motor Coordination Training    Gross Motor Skill, Impairments Detail (P)  B LE IMCHAEL intact  -RZ        Sensory Assessment/Intervention    Sensory Impairment (P)  --   B LE LT intact and symmetrical   -RZ --   WNL  -ND       Positioning and Restraints    Pre-Treatment Position (P)  in bed  -RZ in bed  -ND       Post Treatment Position (P)  bed  -RZ bed  -ND       In Bed (P)  fowlers;call light within reach;encouraged to call for assist;with family/caregiver;side rails up x2  -RZ fowlers;call light within reach;encouraged to call for assist;with family/caregiver;side rails up x2  -ND         User Key  (r) = Recorded By, (t) = Taken By, (c) = Cosigned By    Initials Name Effective Dates    SL Rebecca Easton RN 12/19/16 -     CE Corie Chávez, BSW 09/15/16 -     ND Uyen Brock OTR/L 10/21/16 -     SUSAN Cherry, PT Student 05/08/17 -           Occupational Therapy Education     Title: PT OT SLP Therapies (Done)     Topic: Occupational Therapy (Resolved)     Point: ADL training (Resolved)    Description: Instruct learner(s) on proper safety adaptation and remediation techniques during self care or transfers.   Instruct in proper use of assistive devices.    Learning Progress Summary    Learner Readiness Method Response Comment Documented by Status   Patient Acceptance E VU,NR Pt. educated on role of OT, safe t/f, benefit of activity, progression with poc ND 07/25/17 1410 Done               Point: Home exercise program (Resolved)    Description: Instruct learner(s) on appropriate technique for monitoring, assisting and/or progressing therapeutic exercises/activities.    Learning Progress Summary    Learner Readiness Method Response Comment Documented by Status   Patient Acceptance E VU,NR Pt. educated on role of OT, safe t/f, benefit of activity, progression with poc ND  07/25/17 1410 Done               Point: Body mechanics (Resolved)    Description: Instruct learner(s) on proper positioning and spine alignment during self-care, functional mobility activities and/or exercises.    Learning Progress Summary    Learner Readiness Method Response Comment Documented by Status   Patient Acceptance E CARRIE,NR Pt. educated on role of OT, safe t/f, benefit of activity, progression with poc ND 07/25/17 1410 Done                      User Key     Initials Effective Dates Name Provider Type Discipline    ND 10/21/16 -  Uyen Brock, OTR/L Occupational Therapist OT                OT Recommendation and Plan  Anticipated Discharge Disposition: home with assist  Therapy Frequency: evaluation only  Plan of Care Review  Plan Of Care Reviewed With: patient  Progress: progress toward functional goals as expected  Outcome Summary/Follow up Plan: OT eval completed. Pt. cond I for bed mobility. Pt. SBA for sit to stand t/f and functional mobility. Pt. independent with donning socks. Pt. reports h/a is worsening with light and activity. Pt. does not require skilled OT at this time d/t being at baseline other than severe headache. 7/25/17 1340               Outcome Measures       07/25/17 1400 07/25/17 1314       How much help from another person do you currently need...    Turning from your back to your side while in flat bed without using bedrails?  (P)  4  -RZ     Moving from lying on back to sitting on the side of a flat bed without bedrails?  (P)  4  -RZ     Moving to and from a bed to a chair (including a wheelchair)?  (P)  4  -RZ     Standing up from a chair using your arms (e.g., wheelchair, bedside chair)?  (P)  4  -RZ     Climbing 3-5 steps with a railing?  (P)  3  -RZ     To walk in hospital room?  (P)  4  -RZ     AM-PAC 6 Clicks Score  (P)  23  -ND (r) RZ (t)     How much help from another is currently needed...    Putting on and taking off regular lower body clothing? 4  -ND      Bathing  (including washing, rinsing, and drying) 3  -ND      Toileting (which includes using toilet bed pan or urinal) 4  -ND      Putting on and taking off regular upper body clothing 4  -ND      Taking care of personal grooming (such as brushing teeth) 4  -ND      Eating meals 4  -ND      Score 23  -ND      Functional Assessment    Outcome Measure Options AM-PAC 6 Clicks Daily Activity (OT)  -ND (P)  AM-PAC 6 Clicks Basic Mobility (PT)  -RZ       User Key  (r) = Recorded By, (t) = Taken By, (c) = Cosigned By    Initials Name Provider Type    ND Uyen Brock OTR/L Occupational Therapist    RFREDDIE Cherry, PT Student PT Student          Time Calculation:         Time Calculation- OT       07/25/17 1414          Time Calculation- OT    OT Start Time 1314   10 min chart review not included  -ND      OT Stop Time 1340  -ND      OT Time Calculation (min) 26 min  -ND      OT Received On 07/25/17  -ND        User Key  (r) = Recorded By, (t) = Taken By, (c) = Cosigned By    Initials Name Provider Type    ND Uyen Brock OTR/L Occupational Therapist          Therapy Charges for Today     Code Description Service Date Service Provider Modifiers Qty    93075781087 HC OT SELFCARE CURRENT 7/25/2017 Uyen Brock OTR/L GO, CI 1    75649223047 HC OT SELFCARE PROJECTED 7/25/2017 Uyen Brock OTR/L GO, CI 1    09730131995 HC OT SELFCARE DISCHARGE 7/25/2017 Uyen Brock OTR/L GO, CI 1    05595088920 HC OT EVAL LOW COMPLEXITY 3 7/25/2017 Uyen Brock OTR/L GO, KX 1          OT G-codes  OT Functional Scales Options: AM-PAC 6 Clicks Daily Activity (OT)  Functional Limitation: Self care  Self Care Current Status (): At least 1 percent but less than 20 percent impaired, limited or restricted  Self Care Goal Status (): At least 1 percent but less than 20 percent impaired, limited or restricted  Self Care Discharge Status (): At least 1 percent but less than 20 percent impaired, limited or  restricted    OT Discharge Summary  Anticipated Discharge Disposition: home with assist    Uyen Brock, OTR/SHIRLEY  7/25/2017

## 2017-07-26 VITALS
SYSTOLIC BLOOD PRESSURE: 154 MMHG | DIASTOLIC BLOOD PRESSURE: 86 MMHG | OXYGEN SATURATION: 97 % | RESPIRATION RATE: 16 BRPM | HEART RATE: 57 BPM | TEMPERATURE: 97.8 F | HEIGHT: 67 IN | WEIGHT: 210.6 LBS | BODY MASS INDEX: 33.06 KG/M2

## 2017-07-26 PROCEDURE — 25010000002 HYDROMORPHONE PER 4 MG: Performed by: NEUROLOGICAL SURGERY

## 2017-07-26 PROCEDURE — 99231 SBSQ HOSP IP/OBS SF/LOW 25: CPT | Performed by: NEUROLOGICAL SURGERY

## 2017-07-26 PROCEDURE — 99232 SBSQ HOSP IP/OBS MODERATE 35: CPT | Performed by: PSYCHIATRY & NEUROLOGY

## 2017-07-26 PROCEDURE — 25010000002 ONDANSETRON PER 1 MG: Performed by: NEUROLOGICAL SURGERY

## 2017-07-26 PROCEDURE — 25010000002 KETOROLAC TROMETHAMINE PER 15 MG: Performed by: NEUROLOGICAL SURGERY

## 2017-07-26 PROCEDURE — 99238 HOSP IP/OBS DSCHRG MGMT 30/<: CPT | Performed by: NURSE PRACTITIONER

## 2017-07-26 RX ORDER — TRAMADOL HYDROCHLORIDE 50 MG/1
50 TABLET ORAL EVERY 8 HOURS PRN
Qty: 90 TABLET | Refills: 0 | Status: SHIPPED | OUTPATIENT
Start: 2017-07-26 | End: 2017-08-04

## 2017-07-26 RX ADMIN — ONDANSETRON HYDROCHLORIDE 4 MG: 2 SOLUTION INTRAMUSCULAR; INTRAVENOUS at 02:27

## 2017-07-26 RX ADMIN — DOCUSATE SODIUM -SENNOSIDES 2 TABLET: 50; 8.6 TABLET, COATED ORAL at 08:26

## 2017-07-26 RX ADMIN — HYDROMORPHONE HYDROCHLORIDE 1 MG: 1 INJECTION, SOLUTION INTRAMUSCULAR; INTRAVENOUS; SUBCUTANEOUS at 02:27

## 2017-07-26 RX ADMIN — KETOROLAC TROMETHAMINE 30 MG: 30 INJECTION, SOLUTION INTRAMUSCULAR at 10:37

## 2017-07-26 RX ADMIN — SODIUM CHLORIDE 75 ML/HR: 9 INJECTION, SOLUTION INTRAVENOUS at 06:51

## 2017-07-26 RX ADMIN — HYDROMORPHONE HYDROCHLORIDE 1 MG: 1 INJECTION, SOLUTION INTRAMUSCULAR; INTRAVENOUS; SUBCUTANEOUS at 06:51

## 2017-07-26 NOTE — PLAN OF CARE
Problem: Patient Care Overview (Adult)  Goal: Plan of Care Review  Outcome: Ongoing (interventions implemented as appropriate)    07/26/17 1323   Coping/Psychosocial Response Interventions   Plan Of Care Reviewed With patient   Patient Care Overview   Progress improving   Outcome Evaluation   Outcome Summary/Follow up Plan Pt cont. c/o headache. Pt states any movement and noise causes headache worse.Pt medicated with toradol with good relief.          Problem: Pain, Acute (Adult)  Goal: Acceptable Pain Control/Comfort Level  Outcome: Ongoing (interventions implemented as appropriate)

## 2017-07-26 NOTE — PROGRESS NOTES
Continued Stay Note   Tony     Patient Name: Lior Piper  MRN: [id number]  Today's Date: 7/26/2017    Admit Date: 7/25/2017          Discharge Plan       07/26/17 1556    Case Management/Social Work Plan    Plan Home    Final Note    Final Note Patient discharged home today with no discharge planning needs / orders. [facility] reported to  that patient did not qualify for assistance or Medicaid coverage.              Discharge Codes       07/26/17 1556    Discharge Codes    Discharge Codes 01  Discharge to home        Expected Discharge Date and Time     Expected Discharge Date Expected Discharge Time    Jul 26, 2017             GRIFFIN Charles

## 2017-07-26 NOTE — PLAN OF CARE
Problem: Patient Care Overview (Adult)  Goal: Plan of Care Review  Outcome: Ongoing (interventions implemented as appropriate)    07/26/17 0252   Coping/Psychosocial Response Interventions   Plan Of Care Reviewed With patient   Patient Care Overview   Progress no change   Outcome Evaluation   Outcome Summary/Follow up Plan pt continues to have headache that is only relieved for about 2 hours after dilaudid administration. Pt has been attempting to not take pain medication as he is anxious about being released to go home, he c/o nausea only with ambulation, VSS with elevated Bp, will cont to monitor         Problem: Pain, Acute (Adult)  Goal: Identify Related Risk Factors and Signs and Symptoms  Outcome: Outcome(s) achieved Date Met:  07/26/17  Goal: Acceptable Pain Control/Comfort Level  Outcome: Ongoing (interventions implemented as appropriate)

## 2017-07-26 NOTE — PROGRESS NOTES
Lior Gaspar Veronique  32 y.o.      Chief complaint:   headache    Subjective  Headache still present but improved. No vomiting    Temp:  [97.6 °F (36.4 °C)-99.1 °F (37.3 °C)] 97.8 °F (36.6 °C)  Heart Rate:  [46-85] 57  Resp:  [14-18] 16  BP: (112-161)/(59-96) 154/86      Objective    Neurologic Exam     Mental Status   Oriented to person, place, and time.   Attention: normal.   Speech: speech is normal   Level of consciousness: alert  Knowledge: good.     Cranial Nerves   Cranial nerves II through XII intact.     Motor Exam   Muscle bulk: normal  Overall muscle tone: normal  Right arm pronator drift: absent  Left arm pronator drift: absent    Sensory Exam   Light touch normal.   Pinprick normal.     Gait, Coordination, and Reflexes     Gait  Gait: normal    Coordination   Finger to nose coordination: normal      Active Problems:    Subarachnoid bleed      Lab Results (last 24 hours)     Procedure Component Value Units Date/Time    aPTT [[id number]]  (Normal) Collected:  07/25/17 1110    Specimen:  Blood Updated:  07/25/17 1125     PTT 27.6 seconds     Protime-INR [[id number]]  (Normal) Collected:  07/25/17 1110    Specimen:  Blood Updated:  07/25/17 1125     Protime 12.6 Seconds      INR 0.92    Urinalysis With / Culture If Indicated [[id number]]  (Abnormal) Collected:  07/25/17 1505    Specimen:  Urine Updated:  07/25/17 1547     Color, UA Yellow     Appearance, UA Clear     pH, UA 5.5     Specific Gravity, UA 1.018     Glucose, UA Negative     Ketones, UA Negative     Bilirubin, UA Negative     Blood, UA Negative     Protein, UA Negative     Leuk Esterase, UA Moderate (2+) (A)     Nitrite, UA Negative     Urobilinogen, UA 0.2 E.U./dL    Urinalysis, Microscopic Only [[id number]]  (Abnormal) Collected:  07/25/17 1505    Specimen:  Urine from Urine, Clean Catch Updated:  07/25/17 1547     RBC, UA None Seen /HPF      WBC, UA 13-20 (A) /HPF      Bacteria, UA None Seen /HPF      Squamous Epithelial Cells, UA 0-2 /HPF       Yeast, UA Small/1+ Yeast /HPF      Hyaline Casts, UA 0-2 /LPF      Methodology Automated Microscopy    Urine Culture [[id number]]  (Normal) Collected:  07/25/17 1505    Specimen:  Urine from Urine, Clean Catch Updated:  07/26/17 0639     Urine Culture No growth at 24 hours              Plan:   Headache. Being treated as migrainous syndrome. CTA and LP results taken together are not conclusive for occult SAH. Will discuss with neurology    Jeancarlos Min MD

## 2017-07-26 NOTE — DISCHARGE SUMMARY
Date of Discharge:  7/26/2017    Discharge Diagnosis: Migraine    Presenting Problem/History of Present Illness  Subarachnoid bleed [I60.9]       Hospital Course  Patient is a 32 y.o. male presented with onset of severe headache .  Been lasting for 3 days that was associated with photophobia, nausea, neck stiffness.  He had not had any vomiting or vision changes.  CT scan of head did not reveal subarachnoid hemorrhage.  Lumbar puncture at an outside hospital that show red cells to be at 106 to 1.  White protein was slightly elevated.  Glucose was normal at 63.  We did work the patient up for aneurysm or vascular abnormality however this did not prove to be negative.  It did seem that the patient was having a severe migraine.  We did get neurology involved which did treat him with DHE protocol.  He is also been getting tramadol and a lot of the help of a headache pain.  Currently this on the patient states that his pain has improved.  He feels at this time he is stable and ready to go home.  He is tolerating a by mouth.  He is voiding spontaneously.  He states he does have slight headache but it is definitely improved from what it once was.    Procedures Performed         Consults:   Consults     Date and Time Order Name Status Description    7/25/2017 1042 Inpatient Consult to Neurology                Condition on Discharge:  stable    Vital Signs  Temp:  [97.6 °F (36.4 °C)-99.1 °F (37.3 °C)] 97.8 °F (36.6 °C)  Heart Rate:  [53-73] 57  Resp:  [14-18] 16  BP: (138-161)/(83-96) 154/86    Physical Exam:   Physical Exam   Constitutional: He is oriented to person, place, and time. He appears well-developed and well-nourished.   HENT:   Head: Normocephalic.   Eyes: Conjunctivae, EOM and lids are normal. Pupils are equal, round, and reactive to light.   Neck: Normal range of motion.   Cardiovascular: Normal rate, regular rhythm and normal heart sounds.    Pulmonary/Chest: Effort normal and breath sounds normal.    Abdominal: Normal appearance.   Musculoskeletal: Normal range of motion.   Neurological: He is alert and oriented to person, place, and time. He has normal strength and normal reflexes. He displays normal reflexes. No cranial nerve deficit or sensory deficit. GCS eye subscore is 4. GCS verbal subscore is 5. GCS motor subscore is 6.   Skin: Skin is warm.   Psychiatric: He has a normal mood and affect. His speech is normal and behavior is normal. Thought content normal. Cognition and memory are normal.        Neurologic Exam     Mental Status   Oriented to person, place, and time.   Speech: speech is normal     Cranial Nerves     CN III, IV, VI   Pupils are equal, round, and reactive to light.  Extraocular motions are normal.     Motor Exam     Strength   Strength 5/5 throughout.          Discharge Disposition  Home or Self Care    Discharge Medications   Lior Piper   Home Medication Instructions ALLISON:[id number]    Printed on:07/26/17 4694   Medication Information                      traMADol (ULTRAM) 50 MG tablet  Take 1 tablet by mouth Every 8 (Eight) Hours As Needed for Moderate Pain (4-6) for up to 9 days.                 Discharge Diet:   Diet Instructions     Diet: Regular; Thin Liquids, No Restrictions       Discharge Diet:  Regular   Fluid Consistency:  Thin Liquids, No Restrictions                 Activity at Discharge:   Activity Instructions     Activity as Tolerated                     Follow-up Appointments  No future appointments.  Additional Instructions for the Follow-ups that You Need to Schedule     Additional Discharge Follow-Up (Specify Provider)    As directed    To:  eleuterio woo   Follow Up:  1 Week   Follow Up Details:  new onset of migraines       Call MD With Problems / Concerns    As directed    Instructions: If pt has increase in back or leg pain, loss of motor strength or sensation, drainage from wound, fever, loss of consciousness, seizures, neck or arm pain, N/T,  N/V, difficulty swallowing or breathing, call office or go to ER       CT Angiogram Head With & Without Contrast    Jul 31, 2017    To be done on day of appt with DR. Min   Reason for Exam:  question of aneurysm   To be done on day of appt with DR. Min       Follow-Up    Aug 10, 2017    Follow Up Details:  2 weeks with Dr. Min                 Test Results Pending at Discharge   Order Current Status    Urine Culture Preliminary result           Mikey Arambula, MARK  07/26/17  1:44 PM    Time: Discharge 30 min

## 2017-07-26 NOTE — PROGRESS NOTES
Neurology Progress Note      Chief Complaint:  headache    Subjective     Subjective:    Headache resolved after IVF and 2 doses of DHE  Medications:  Current Facility-Administered Medications   Medication Dose Route Frequency Provider Last Rate Last Dose   • acetaminophen (TYLENOL) suppository 650 mg  650 mg Rectal Q4H PRN Jeancarlos Min MD       • bisacodyl (DULCOLAX) suppository 10 mg  10 mg Rectal Daily PRN Jeancarlos Min MD       • dihydroergotamine (DHE) 0.75 mg in sodium chloride 0.9 % 50 mL IVPB  0.75 mg Intravenous Once PRN Ever Nolan MD       • HYDROmorphone (DILAUDID) injection 1 mg  1 mg Intravenous Q2H PRN Jeancarlos Min MD   1 mg at 07/26/17 0651    And   • naloxone (NARCAN) injection 0.4 mg  0.4 mg Intravenous Q5 Min PRN Jeancarlos Min MD       • ketorolac (TORADOL) injection 30 mg  30 mg Intravenous Q6H PRN Jeancarlos Min MD   30 mg at 07/26/17 1037   • ondansetron (ZOFRAN) injection 4 mg  4 mg Intravenous Q6H PRN Jeancarlos Min MD   4 mg at 07/26/17 0227   • ondansetron (ZOFRAN) injection 4 mg  4 mg Intravenous Q8H Ever Nolan MD   4 mg at 07/25/17 2051   • sennosides-docusate sodium (SENOKOT-S) 8.6-50 MG tablet 2 tablet  2 tablet Oral BID Jeancarlos Min MD   2 tablet at 07/26/17 0826   • sodium chloride 0.9 % flush 1-10 mL  1-10 mL Intravenous PRN Jeancarlos Min MD       • sodium chloride 0.9 % infusion  75 mL/hr Intravenous Continuous Jeancarlos Min MD 75 mL/hr at 07/26/17 0651 75 mL/hr at 07/26/17 0651   • sodium chloride 0.9 % infusion  125 mL/hr Intravenous Continuous Ever Nolan  mL/hr at 07/25/17 1537 125 mL/hr at 07/25/17 1537   • traMADol (ULTRAM) tablet 50 mg  50 mg Oral Q6H PRN Jeancarlos Min MD           Review of Systems:   -A 14 point review of systems is completed and is negative except for headache      Objective      Vital Signs  Temp:  [97.6 °F (36.4 °C)-99.1 °F (37.3 °C)] 97.8 °F (36.6 °C)  Heart Rate:  [53-58] 57  Resp:  [14-18]  16  BP: (146-161)/(83-96) 154/86    Physical Exam:    General Exam:  Head:  Normal cephalic, atraumatic  HEENT:  Neck supple  Fundoscopic Exam:  No signs of disc edema  CVS:  Regular rate and rhythm.  No murmurs  Carotid Examination:  No bruits  Lungs:  Clear to auscultation  Abdomen:  Non-tender, Non-distended  Extremities:  No signs of peripheral edema  Skin:  No rashes    Neurologic Exam:    Mental Status:    -Awake, Alert, Oriented X 3  -No word finding difficulties  -No aphasia  -No dysarthria  -Follows simple and complex commands    CN II:  Visual fields full.  Pupils equally reactive to light  CN III, IV, VI:  Extraocular Muscles full with no signs of nystagmus  CN V:  Facial sensory is symmetric with no asymetries.  CN VII:  Facial motor symmetric  CN VIII:  Gross hearing intact bilaterally  CN IX:  Palate elevates symmetrically  CN X:  Palate elevates symmetrically  CN XI:  Shoulder shrug symmetric  CN XII:  Tongue is midline on protrusion    Motor: (strength out of 5:  1= minimal movement, 2 = movement in plane of gravity, 3 = movement against gravity, 4 = movement against some resistance, 5 = full strength)    -Right Upper Ext: Proximal: 5 Distal: 5  -Left Upper Ext: Proximal: 5 Distal: 5    -Right Lower Ext: Proximal: 5 Distal: 5  -Left Lower Ext: Proximal: 5 Distal: 5    DTR:  -Right   Bicep: 2+ Tricep: 2+ Brachoradialis: 2+   Patella: 2+ Ankle: 2+ Neg Babinski  -Left   Bicep: 2+ Tricep: 2+ Brachoradialis: 2+   Patella: 2+ Ankle: 2+ Neg Babinski    Sensory:  -Intact to light touch, pinprick, temperature, pain, and proprioception    Coordination:  -Finger to nose intact  -Heel to shin intact  -No ataxia    Gait  -No signs of ataxia  -ambulates unassisted       Results Review:    I reviewed the patient's new clinical results.                 Invalid input(s): KRISHNA BETANCOURT     Lab Results   Component Value Date    PROTIME 12.6 07/25/2017    INR 0.92 07/25/2017     No components found for: POCGLUC  No  components found for: A1C  No results found for: HDL, LDL  No components found for: B12  No results found for: TSH    Assessment/Plan     Hospital Problem List    Active Problems:    Subarachnoid bleed  CTA of head reviewed  Impression:  Status migranosis  --resolved with DHE    Plan:  --Follow up with neurology in 2-3 weeks.        Ever Nolan MD  07/26/17  2:00 PM

## 2017-07-27 LAB — BACTERIA SPEC AEROBE CULT: NORMAL

## 2017-08-04 ENCOUNTER — TELEPHONE (OUTPATIENT)
Dept: NEUROSURGERY | Facility: CLINIC | Age: 32
End: 2017-08-04

## 2017-08-04 NOTE — TELEPHONE ENCOUNTER
Scheduling called to inform our office that this pt cancelled his upcoming CTA apt; due to not having insurance and being worried about the cost. I sent an ib message to nadine to inform him of this incase he wanted to contact the pt.

## 2017-08-08 ENCOUNTER — APPOINTMENT (OUTPATIENT)
Dept: CT IMAGING | Facility: HOSPITAL | Age: 32
End: 2017-08-08

## 2019-07-08 ENCOUNTER — HOSPITAL ENCOUNTER (EMERGENCY)
Facility: HOSPITAL | Age: 34
Discharge: HOME | End: 2019-07-08
Attending: EMERGENCY MEDICINE | Admitting: EMERGENCY MEDICINE

## 2019-07-08 VITALS
HEIGHT: 69 IN | WEIGHT: 195 LBS | SYSTOLIC BLOOD PRESSURE: 156 MMHG | DIASTOLIC BLOOD PRESSURE: 92 MMHG | OXYGEN SATURATION: 98 % | HEART RATE: 65 BPM | TEMPERATURE: 98.7 F | BODY MASS INDEX: 28.88 KG/M2 | RESPIRATION RATE: 20 BRPM

## 2019-07-08 DIAGNOSIS — S05.02XA ABRASION OF LEFT CORNEA, INITIAL ENCOUNTER: Primary | ICD-10-CM

## 2019-07-08 PROCEDURE — 90715 TDAP VACCINE 7 YRS/> IM: CPT | Performed by: EMERGENCY MEDICINE

## 2019-07-08 PROCEDURE — 25010000002 KETOROLAC TROMETHAMINE PER 15 MG: Performed by: EMERGENCY MEDICINE

## 2019-07-08 PROCEDURE — 99284 EMERGENCY DEPT VISIT MOD MDM: CPT

## 2019-07-08 PROCEDURE — 90471 IMMUNIZATION ADMIN: CPT | Performed by: EMERGENCY MEDICINE

## 2019-07-08 PROCEDURE — 25010000002 TDAP 5-2.5-18.5 LF-MCG/0.5 SUSPENSION: Performed by: EMERGENCY MEDICINE

## 2019-07-08 PROCEDURE — 96372 THER/PROPH/DIAG INJ SC/IM: CPT

## 2019-07-08 RX ORDER — HYDROCODONE BITARTRATE AND ACETAMINOPHEN 7.5; 325 MG/1; MG/1
1 TABLET ORAL ONCE
Status: COMPLETED | OUTPATIENT
Start: 2019-07-08 | End: 2019-07-08

## 2019-07-08 RX ORDER — KETOROLAC TROMETHAMINE 30 MG/ML
30 INJECTION, SOLUTION INTRAMUSCULAR; INTRAVENOUS ONCE
Status: COMPLETED | OUTPATIENT
Start: 2019-07-08 | End: 2019-07-08

## 2019-07-08 RX ORDER — OXYCODONE AND ACETAMINOPHEN 7.5; 325 MG/1; MG/1
1 TABLET ORAL ONCE
Status: COMPLETED | OUTPATIENT
Start: 2019-07-08 | End: 2019-07-08

## 2019-07-08 RX ORDER — OFLOXACIN 3 MG/ML
2 SOLUTION/ DROPS OPHTHALMIC ONCE
Status: COMPLETED | OUTPATIENT
Start: 2019-07-08 | End: 2019-07-08

## 2019-07-08 RX ORDER — PROPARACAINE HYDROCHLORIDE 5 MG/ML
SOLUTION/ DROPS OPHTHALMIC
Status: DISCONTINUED
Start: 2019-07-08 | End: 2019-07-08 | Stop reason: HOSPADM

## 2019-07-08 RX ORDER — HYDROCODONE BITARTRATE AND ACETAMINOPHEN 5; 325 MG/1; MG/1
1 TABLET ORAL EVERY 6 HOURS PRN
Qty: 10 TABLET | Refills: 0 | Status: SHIPPED | OUTPATIENT
Start: 2019-07-08 | End: 2021-10-05

## 2019-07-08 RX ADMIN — OXYCODONE HYDROCHLORIDE AND ACETAMINOPHEN 1 TABLET: 7.5; 325 TABLET ORAL at 09:13

## 2019-07-08 RX ADMIN — KETOROLAC TROMETHAMINE 30 MG: 60 INJECTION, SOLUTION INTRAMUSCULAR at 08:10

## 2019-07-08 RX ADMIN — TETANUS TOXOID, REDUCED DIPHTHERIA TOXOID AND ACELLULAR PERTUSSIS VACCINE, ADSORBED 0.5 ML: 5; 2.5; 8; 8; 2.5 SUSPENSION INTRAMUSCULAR at 07:19

## 2019-07-08 RX ADMIN — OFLOXACIN 2 DROP: 3 SOLUTION OPHTHALMIC at 07:21

## 2019-07-08 RX ADMIN — HYDROCODONE BITARTRATE AND ACETAMINOPHEN 1 TABLET: 7.5; 325 TABLET ORAL at 07:18
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
## 2021-09-12 ENCOUNTER — HOSPITAL ENCOUNTER (INPATIENT)
Facility: HOSPITAL | Age: 36
LOS: 6 days | Discharge: HOME | End: 2021-09-18
Attending: EMERGENCY MEDICINE | Admitting: INTERNAL MEDICINE

## 2021-09-12 ENCOUNTER — APPOINTMENT (OUTPATIENT)
Dept: CT IMAGING | Facility: HOSPITAL | Age: 36
End: 2021-09-12

## 2021-09-12 ENCOUNTER — HOSPITAL ENCOUNTER (EMERGENCY)
Facility: HOSPITAL | Age: 36
Discharge: HOME | End: 2021-09-12
Attending: STUDENT IN AN ORGANIZED HEALTH CARE EDUCATION/TRAINING PROGRAM | Admitting: STUDENT IN AN ORGANIZED HEALTH CARE EDUCATION/TRAINING PROGRAM

## 2021-09-12 ENCOUNTER — APPOINTMENT (OUTPATIENT)
Dept: GENERAL RADIOLOGY | Facility: HOSPITAL | Age: 36
End: 2021-09-12

## 2021-09-12 VITALS
HEIGHT: 69 IN | RESPIRATION RATE: 20 BRPM | WEIGHT: 195 LBS | HEART RATE: 82 BPM | TEMPERATURE: 98 F | DIASTOLIC BLOOD PRESSURE: 69 MMHG | SYSTOLIC BLOOD PRESSURE: 127 MMHG | OXYGEN SATURATION: 98 % | BODY MASS INDEX: 28.88 KG/M2

## 2021-09-12 DIAGNOSIS — U07.1 PNEUMONIA DUE TO COVID-19 VIRUS: Primary | ICD-10-CM

## 2021-09-12 DIAGNOSIS — J12.82 PNEUMONIA DUE TO COVID-19 VIRUS: Primary | ICD-10-CM

## 2021-09-12 DIAGNOSIS — R06.02 SOB (SHORTNESS OF BREATH): Primary | ICD-10-CM

## 2021-09-12 DIAGNOSIS — R09.02 HYPOXIA: ICD-10-CM

## 2021-09-12 DIAGNOSIS — R52 BODY ACHES: ICD-10-CM

## 2021-09-12 LAB
ALBUMIN SERPL-MCNC: 3.9 G/DL (ref 3.5–5.2)
ALBUMIN SERPL-MCNC: 3.9 G/DL (ref 3.5–5.2)
ALBUMIN SERPL-MCNC: 4 G/DL (ref 3.5–5.2)
ALBUMIN/GLOB SERPL: 1.1 G/DL
ALBUMIN/GLOB SERPL: 1.2 G/DL
ALP SERPL-CCNC: 57 U/L (ref 39–117)
ALT SERPL W P-5'-P-CCNC: 40 U/L (ref 1–41)
ALT SERPL W P-5'-P-CCNC: 50 U/L (ref 1–41)
ALT SERPL W P-5'-P-CCNC: 50 U/L (ref 1–41)
ANION GAP SERPL CALCULATED.3IONS-SCNC: 11 MMOL/L (ref 5–15)
ANION GAP SERPL CALCULATED.3IONS-SCNC: 14 MMOL/L (ref 5–15)
ARTERIAL PATENCY WRIST A: ABNORMAL
AST SERPL-CCNC: 45 U/L (ref 1–40)
AST SERPL-CCNC: 63 U/L (ref 1–40)
AST SERPL-CCNC: 63 U/L (ref 1–40)
ATMOSPHERIC PRESS: 751 MMHG
BASE EXCESS BLDA CALC-SCNC: 1.8 MMOL/L (ref 0–2)
BASOPHILS # BLD AUTO: 0.02 10*3/MM3 (ref 0–0.2)
BASOPHILS # BLD AUTO: 0.03 10*3/MM3 (ref 0–0.2)
BASOPHILS NFR BLD AUTO: 0.3 % (ref 0–1.5)
BASOPHILS NFR BLD AUTO: 0.5 % (ref 0–1.5)
BDY SITE: ABNORMAL
BILIRUB CONJ SERPL-MCNC: <0.2 MG/DL (ref 0–0.3)
BILIRUB INDIRECT SERPL-MCNC: ABNORMAL MG/DL
BILIRUB SERPL-MCNC: 0.3 MG/DL (ref 0–1.2)
BUN SERPL-MCNC: 20 MG/DL (ref 6–20)
BUN SERPL-MCNC: 22 MG/DL (ref 6–20)
BUN/CREAT SERPL: 19 (ref 7–25)
BUN/CREAT SERPL: 19.3 (ref 7–25)
CALCIUM SPEC-SCNC: 8.8 MG/DL (ref 8.6–10.5)
CALCIUM SPEC-SCNC: 9.1 MG/DL (ref 8.6–10.5)
CHLORIDE SERPL-SCNC: 97 MMOL/L (ref 98–107)
CHLORIDE SERPL-SCNC: 98 MMOL/L (ref 98–107)
CO2 SERPL-SCNC: 25 MMOL/L (ref 22–29)
CO2 SERPL-SCNC: 27 MMOL/L (ref 22–29)
CREAT SERPL-MCNC: 1.05 MG/DL (ref 0.76–1.27)
CREAT SERPL-MCNC: 1.05 MG/DL (ref 0.76–1.27)
CREAT SERPL-MCNC: 1.14 MG/DL (ref 0.76–1.27)
CRP SERPL-MCNC: 15.76 MG/DL (ref 0–0.5)
D-DIMER, QUANTITATIVE (MAD,POW, STR): 680 NG/ML (FEU) (ref 0–470)
DEPRECATED RDW RBC AUTO: 43.9 FL (ref 37–54)
DEPRECATED RDW RBC AUTO: 44.2 FL (ref 37–54)
EOSINOPHIL # BLD AUTO: 0 10*3/MM3 (ref 0–0.4)
EOSINOPHIL # BLD AUTO: 0 10*3/MM3 (ref 0–0.4)
EOSINOPHIL NFR BLD AUTO: 0 % (ref 0.3–6.2)
EOSINOPHIL NFR BLD AUTO: 0 % (ref 0.3–6.2)
ERYTHROCYTE [DISTWIDTH] IN BLOOD BY AUTOMATED COUNT: 13.8 % (ref 12.3–15.4)
ERYTHROCYTE [DISTWIDTH] IN BLOOD BY AUTOMATED COUNT: 13.9 % (ref 12.3–15.4)
FLUAV RNA RESP QL NAA+PROBE: NOT DETECTED
FLUBV RNA RESP QL NAA+PROBE: NOT DETECTED
GFR SERPL CREATININE-BSD FRML MDRD: 73 ML/MIN/1.73
GFR SERPL CREATININE-BSD FRML MDRD: 80 ML/MIN/1.73
GFR SERPL CREATININE-BSD FRML MDRD: 80 ML/MIN/1.73
GLOBULIN UR ELPH-MCNC: 3.4 GM/DL
GLOBULIN UR ELPH-MCNC: 3.4 GM/DL
GLUCOSE SERPL-MCNC: 118 MG/DL (ref 65–99)
GLUCOSE SERPL-MCNC: 95 MG/DL (ref 65–99)
HCO3 BLDA-SCNC: 25.6 MMOL/L (ref 20–26)
HCT VFR BLD AUTO: 41.9 % (ref 37.5–51)
HCT VFR BLD AUTO: 42.9 % (ref 37.5–51)
HGB BLD-MCNC: 13.9 G/DL (ref 13–17.7)
HGB BLD-MCNC: 14 G/DL (ref 13–17.7)
HOLD SPECIMEN: NORMAL
IMM GRANULOCYTES # BLD AUTO: 0.14 10*3/MM3 (ref 0–0.05)
IMM GRANULOCYTES # BLD AUTO: 0.16 10*3/MM3 (ref 0–0.05)
IMM GRANULOCYTES NFR BLD AUTO: 2.2 % (ref 0–0.5)
IMM GRANULOCYTES NFR BLD AUTO: 2.7 % (ref 0–0.5)
LDH SERPL-CCNC: 576 U/L (ref 135–225)
LYMPHOCYTES # BLD AUTO: 0.46 10*3/MM3 (ref 0.7–3.1)
LYMPHOCYTES # BLD AUTO: 0.64 10*3/MM3 (ref 0.7–3.1)
LYMPHOCYTES NFR BLD AUTO: 7.7 % (ref 19.6–45.3)
LYMPHOCYTES NFR BLD AUTO: 9.8 % (ref 19.6–45.3)
Lab: ABNORMAL
MCH RBC QN AUTO: 28.2 PG (ref 26.6–33)
MCH RBC QN AUTO: 28.7 PG (ref 26.6–33)
MCHC RBC AUTO-ENTMCNC: 32.6 G/DL (ref 31.5–35.7)
MCHC RBC AUTO-ENTMCNC: 33.2 G/DL (ref 31.5–35.7)
MCV RBC AUTO: 86.3 FL (ref 79–97)
MCV RBC AUTO: 86.6 FL (ref 79–97)
MODALITY: ABNORMAL
MONOCYTES # BLD AUTO: 0.19 10*3/MM3 (ref 0.1–0.9)
MONOCYTES # BLD AUTO: 0.29 10*3/MM3 (ref 0.1–0.9)
MONOCYTES NFR BLD AUTO: 3.2 % (ref 5–12)
MONOCYTES NFR BLD AUTO: 4.5 % (ref 5–12)
NEUTROPHILS NFR BLD AUTO: 5.14 10*3/MM3 (ref 1.7–7)
NEUTROPHILS NFR BLD AUTO: 5.41 10*3/MM3 (ref 1.7–7)
NEUTROPHILS NFR BLD AUTO: 83 % (ref 42.7–76)
NEUTROPHILS NFR BLD AUTO: 86.1 % (ref 42.7–76)
NRBC BLD AUTO-RTO: 0 /100 WBC (ref 0–0.2)
NRBC BLD AUTO-RTO: 0 /100 WBC (ref 0–0.2)
NT-PROBNP SERPL-MCNC: 90.5 PG/ML (ref 0–450)
PCO2 BLDA: 36.6 MM HG (ref 35–45)
PH BLDA: 7.45 PH UNITS (ref 7.35–7.45)
PLATELET # BLD AUTO: 228 10*3/MM3 (ref 140–450)
PLATELET # BLD AUTO: 266 10*3/MM3 (ref 140–450)
PMV BLD AUTO: 10.1 FL (ref 6–12)
PMV BLD AUTO: 9.8 FL (ref 6–12)
PO2 BLDA: 57.3 MM HG (ref 83–108)
POTASSIUM SERPL-SCNC: 4.7 MMOL/L (ref 3.5–5.2)
POTASSIUM SERPL-SCNC: 5.1 MMOL/L (ref 3.5–5.2)
PROCALCITONIN SERPL-MCNC: 0.13 NG/ML (ref 0–0.25)
PROT SERPL-MCNC: 7.3 G/DL (ref 6–8.5)
PROT SERPL-MCNC: 7.3 G/DL (ref 6–8.5)
PROT SERPL-MCNC: 7.4 G/DL (ref 6–8.5)
RBC # BLD AUTO: 4.84 10*6/MM3 (ref 4.14–5.8)
RBC # BLD AUTO: 4.97 10*6/MM3 (ref 4.14–5.8)
SAO2 % BLDCOA: 90.9 % (ref 94–99)
SARS-COV-2 RNA RESP QL NAA+PROBE: DETECTED
SODIUM SERPL-SCNC: 135 MMOL/L (ref 136–145)
SODIUM SERPL-SCNC: 137 MMOL/L (ref 136–145)
TROPONIN T SERPL-MCNC: <0.01 NG/ML (ref 0–0.03)
VENTILATOR MODE: ABNORMAL
WBC # BLD AUTO: 5.97 10*3/MM3 (ref 3.4–10.8)
WBC # BLD AUTO: 6.51 10*3/MM3 (ref 3.4–10.8)
WHOLE BLOOD HOLD SPECIMEN: NORMAL
WHOLE BLOOD HOLD SPECIMEN: NORMAL

## 2021-09-12 PROCEDURE — 36600 WITHDRAWAL OF ARTERIAL BLOOD: CPT

## 2021-09-12 PROCEDURE — 85025 COMPLETE CBC W/AUTO DIFF WBC: CPT | Performed by: INTERNAL MEDICINE

## 2021-09-12 PROCEDURE — 87636 SARSCOV2 & INF A&B AMP PRB: CPT

## 2021-09-12 PROCEDURE — 80053 COMPREHEN METABOLIC PANEL: CPT | Performed by: INTERNAL MEDICINE

## 2021-09-12 PROCEDURE — 84484 ASSAY OF TROPONIN QUANT: CPT

## 2021-09-12 PROCEDURE — 80076 HEPATIC FUNCTION PANEL: CPT | Performed by: INTERNAL MEDICINE

## 2021-09-12 PROCEDURE — 93010 ELECTROCARDIOGRAM REPORT: CPT | Performed by: INTERNAL MEDICINE

## 2021-09-12 PROCEDURE — 25010000002 DEXAMETHASONE PER 1 MG: Performed by: EMERGENCY MEDICINE

## 2021-09-12 PROCEDURE — 71046 X-RAY EXAM CHEST 2 VIEWS: CPT

## 2021-09-12 PROCEDURE — 84145 PROCALCITONIN (PCT): CPT | Performed by: EMERGENCY MEDICINE

## 2021-09-12 PROCEDURE — 83880 ASSAY OF NATRIURETIC PEPTIDE: CPT

## 2021-09-12 PROCEDURE — 85025 COMPLETE CBC W/AUTO DIFF WBC: CPT

## 2021-09-12 PROCEDURE — 0 IOPAMIDOL PER 1 ML: Performed by: EMERGENCY MEDICINE

## 2021-09-12 PROCEDURE — 25010000002 DEXAMETHASONE SODIUM PHOSPHATE 10 MG/ML SOLUTION: Performed by: STUDENT IN AN ORGANIZED HEALTH CARE EDUCATION/TRAINING PROGRAM

## 2021-09-12 PROCEDURE — 86140 C-REACTIVE PROTEIN: CPT | Performed by: INTERNAL MEDICINE

## 2021-09-12 PROCEDURE — 93005 ELECTROCARDIOGRAM TRACING: CPT | Performed by: STUDENT IN AN ORGANIZED HEALTH CARE EDUCATION/TRAINING PROGRAM

## 2021-09-12 PROCEDURE — 82803 BLOOD GASES ANY COMBINATION: CPT

## 2021-09-12 PROCEDURE — 94640 AIRWAY INHALATION TREATMENT: CPT

## 2021-09-12 PROCEDURE — 96372 THER/PROPH/DIAG INJ SC/IM: CPT

## 2021-09-12 PROCEDURE — XW033E5 INTRODUCTION OF REMDESIVIR ANTI-INFECTIVE INTO PERIPHERAL VEIN, PERCUTANEOUS APPROACH, NEW TECHNOLOGY GROUP 5: ICD-10-PCS | Performed by: HOSPITALIST

## 2021-09-12 PROCEDURE — 71275 CT ANGIOGRAPHY CHEST: CPT

## 2021-09-12 PROCEDURE — 83615 LACTATE (LD) (LDH) ENZYME: CPT | Performed by: INTERNAL MEDICINE

## 2021-09-12 PROCEDURE — 94799 UNLISTED PULMONARY SVC/PX: CPT

## 2021-09-12 PROCEDURE — 82565 ASSAY OF CREATININE: CPT | Performed by: INTERNAL MEDICINE

## 2021-09-12 PROCEDURE — 80053 COMPREHEN METABOLIC PANEL: CPT

## 2021-09-12 PROCEDURE — 99283 EMERGENCY DEPT VISIT LOW MDM: CPT

## 2021-09-12 PROCEDURE — 99284 EMERGENCY DEPT VISIT MOD MDM: CPT

## 2021-09-12 PROCEDURE — 85379 FIBRIN DEGRADATION QUANT: CPT | Performed by: EMERGENCY MEDICINE

## 2021-09-12 RX ORDER — ACETAMINOPHEN 325 MG/1
650 TABLET ORAL EVERY 4 HOURS PRN
Status: DISCONTINUED | OUTPATIENT
Start: 2021-09-12 | End: 2021-09-18 | Stop reason: HOSPADM

## 2021-09-12 RX ORDER — ALBUTEROL SULFATE 90 UG/1
2 AEROSOL, METERED RESPIRATORY (INHALATION)
Status: DISCONTINUED | OUTPATIENT
Start: 2021-09-12 | End: 2021-09-18 | Stop reason: HOSPADM

## 2021-09-12 RX ORDER — DEXAMETHASONE SODIUM PHOSPHATE 4 MG/ML
6 INJECTION, SOLUTION INTRA-ARTICULAR; INTRALESIONAL; INTRAMUSCULAR; INTRAVENOUS; SOFT TISSUE ONCE
Status: COMPLETED | OUTPATIENT
Start: 2021-09-12 | End: 2021-09-12

## 2021-09-12 RX ORDER — DEXAMETHASONE SODIUM PHOSPHATE 10 MG/ML
6 INJECTION, SOLUTION INTRAMUSCULAR; INTRAVENOUS ONCE
Status: COMPLETED | OUTPATIENT
Start: 2021-09-12 | End: 2021-09-12

## 2021-09-12 RX ORDER — IPRATROPIUM BROMIDE AND ALBUTEROL SULFATE 2.5; .5 MG/3ML; MG/3ML
3 SOLUTION RESPIRATORY (INHALATION) EVERY 6 HOURS PRN
Status: DISCONTINUED | OUTPATIENT
Start: 2021-09-12 | End: 2021-09-13

## 2021-09-12 RX ORDER — SODIUM CHLORIDE 0.9 % (FLUSH) 0.9 %
10 SYRINGE (ML) INJECTION AS NEEDED
Status: DISCONTINUED | OUTPATIENT
Start: 2021-09-12 | End: 2021-09-18 | Stop reason: HOSPADM

## 2021-09-12 RX ORDER — ACETAMINOPHEN 650 MG/1
650 SUPPOSITORY RECTAL EVERY 4 HOURS PRN
Status: DISCONTINUED | OUTPATIENT
Start: 2021-09-12 | End: 2021-09-18 | Stop reason: HOSPADM

## 2021-09-12 RX ORDER — BENZONATATE 100 MG/1
100 CAPSULE ORAL 3 TIMES DAILY PRN
Status: DISCONTINUED | OUTPATIENT
Start: 2021-09-12 | End: 2021-09-18 | Stop reason: HOSPADM

## 2021-09-12 RX ORDER — DEXAMETHASONE SODIUM PHOSPHATE 4 MG/ML
6 INJECTION, SOLUTION INTRA-ARTICULAR; INTRALESIONAL; INTRAMUSCULAR; INTRAVENOUS; SOFT TISSUE DAILY
Status: DISCONTINUED | OUTPATIENT
Start: 2021-09-13 | End: 2021-09-18 | Stop reason: HOSPADM

## 2021-09-12 RX ADMIN — IOPAMIDOL 71 ML: 755 INJECTION, SOLUTION INTRAVENOUS at 21:32

## 2021-09-12 RX ADMIN — ALBUTEROL SULFATE 2 PUFF: 90 AEROSOL, METERED RESPIRATORY (INHALATION) at 22:17

## 2021-09-12 RX ADMIN — DEXAMETHASONE SODIUM PHOSPHATE 6 MG: 10 INJECTION, SOLUTION INTRAMUSCULAR; INTRAVENOUS at 14:52

## 2021-09-12 RX ADMIN — DEXAMETHASONE SODIUM PHOSPHATE 6 MG: 4 INJECTION, SOLUTION INTRAMUSCULAR; INTRAVENOUS at 22:19

## 2021-09-12 NOTE — ED NOTES
Ambulated patient inside the room O2 sats dropped to 93% on room air     Tammy Esteban  09/12/21 1400

## 2021-09-12 NOTE — ED PROVIDER NOTES
"Subjective   36-year-old male comes to the ER chief complaint of worsening shortness of breath.  Patient reports being diagnosed with the coronavirus about a week ago.  He endorses body aches, fatigue and addition to the shortness of breath.  No fever, chills, cough.  No abdominal symptoms.       History provided by:  Patient   used: No        Review of Systems   Constitutional: Positive for activity change and fatigue. Negative for appetite change, chills and fever.   HENT: Negative for congestion and rhinorrhea.    Respiratory: Positive for shortness of breath. Negative for cough, chest tightness and wheezing.    Cardiovascular: Negative for chest pain and palpitations.   Gastrointestinal: Negative for abdominal pain and nausea.   Musculoskeletal: Positive for arthralgias and myalgias.   Skin: Negative for color change and rash.   Neurological: Negative for dizziness and headaches.   Psychiatric/Behavioral: Negative for agitation. The patient is not nervous/anxious.        History reviewed. No pertinent past medical history.    Allergies   Allergen Reactions   • Chocolate Other (See Comments)       History reviewed. No pertinent surgical history.    Family History   Problem Relation Age of Onset   • Hypertension Mother    • COPD Mother    • Hypertension Father    • Diabetes Brother        Social History     Socioeconomic History   • Marital status:      Spouse name: Not on file   • Number of children: Not on file   • Years of education: Not on file   • Highest education level: Not on file   Tobacco Use   • Smoking status: Never Smoker   • Smokeless tobacco: Current User     Types: Chew   Substance and Sexual Activity   • Alcohol use: Yes     Alcohol/week: 1.0 standard drinks     Types: 1 Shots of liquor per week     Comment: \"one coctail a day\"   • Drug use: Defer   • Sexual activity: Defer           Objective    Vitals:    09/12/21 1047 09/12/21 1052 09/12/21 1229   BP: 121/80     BP " "Location: Right arm     Patient Position: Sitting     Pulse: 88  90   Resp: 20     Temp: 98 °F (36.7 °C)     TempSrc: Infrared     SpO2: (!) 88% 93% 97%   Weight: 88.5 kg (195 lb)     Height: 175.3 cm (69\")         Physical Exam  Vitals and nursing note reviewed.   Constitutional:       General: He is not in acute distress.     Appearance: He is well-developed. He is ill-appearing. He is not toxic-appearing or diaphoretic.   HENT:      Head: Normocephalic.      Right Ear: External ear normal.      Left Ear: External ear normal.   Pulmonary:      Effort: Pulmonary effort is normal. No accessory muscle usage or respiratory distress.      Breath sounds: No decreased breath sounds or wheezing.   Chest:      Chest wall: No tenderness.   Abdominal:      Palpations: Abdomen is soft.      Tenderness: There is no abdominal tenderness (deep palpation).   Skin:     General: Skin is warm and dry.      Capillary Refill: Capillary refill takes less than 2 seconds.   Neurological:      Mental Status: He is alert and oriented to person, place, and time.   Psychiatric:         Behavior: Behavior normal.         ECG 12 Lead      Date/Time: 9/12/2021 2:28 PM  Performed by: Jimenez Rivera MD  Authorized by: Jimenez Rivera MD   Interpreted by physician  Rhythm: sinus rhythm  Rate: normal  QRS axis: normal  ST Segments: ST segments normal  T Waves: T waves normal  Clinical impression: normal ECG                 ED Course      Results for orders placed or performed during the hospital encounter of 09/12/21   COVID-19 and FLU A/B PCR - Swab, Nasopharynx    Specimen: Nasopharynx; Swab   Result Value Ref Range    COVID19 Detected (C) Not Detected - Ref. Range    Influenza A PCR Not Detected Not Detected    Influenza B PCR Not Detected Not Detected   Comprehensive Metabolic Panel    Specimen: Blood   Result Value Ref Range    Glucose 95 65 - 99 mg/dL    BUN 22 (H) 6 - 20 mg/dL    Creatinine 1.14 0.76 - 1.27 mg/dL    Sodium 135 (L) 136 - " 145 mmol/L    Potassium 4.7 3.5 - 5.2 mmol/L    Chloride 97 (L) 98 - 107 mmol/L    CO2 27.0 22.0 - 29.0 mmol/L    Calcium 9.1 8.6 - 10.5 mg/dL    Total Protein 7.4 6.0 - 8.5 g/dL    Albumin 4.00 3.50 - 5.20 g/dL    ALT (SGPT) 40 1 - 41 U/L    AST (SGOT) 45 (H) 1 - 40 U/L    Alkaline Phosphatase 57 39 - 117 U/L    Total Bilirubin 0.3 0.0 - 1.2 mg/dL    eGFR Non African Amer 73 >60 mL/min/1.73    Globulin 3.4 gm/dL    A/G Ratio 1.2 g/dL    BUN/Creatinine Ratio 19.3 7.0 - 25.0    Anion Gap 11.0 5.0 - 15.0 mmol/L   BNP    Specimen: Blood   Result Value Ref Range    proBNP 90.5 0.0 - 450.0 pg/mL   Troponin    Specimen: Blood   Result Value Ref Range    Troponin T <0.010 0.000 - 0.030 ng/mL   CBC Auto Differential    Specimen: Blood   Result Value Ref Range    WBC 6.51 3.40 - 10.80 10*3/mm3    RBC 4.97 4.14 - 5.80 10*6/mm3    Hemoglobin 14.0 13.0 - 17.7 g/dL    Hematocrit 42.9 37.5 - 51.0 %    MCV 86.3 79.0 - 97.0 fL    MCH 28.2 26.6 - 33.0 pg    MCHC 32.6 31.5 - 35.7 g/dL    RDW 13.8 12.3 - 15.4 %    RDW-SD 43.9 37.0 - 54.0 fl    MPV 9.8 6.0 - 12.0 fL    Platelets 228 140 - 450 10*3/mm3    Neutrophil % 83.0 (H) 42.7 - 76.0 %    Lymphocyte % 9.8 (L) 19.6 - 45.3 %    Monocyte % 4.5 (L) 5.0 - 12.0 %    Eosinophil % 0.0 (L) 0.3 - 6.2 %    Basophil % 0.5 0.0 - 1.5 %    Immature Grans % 2.2 (H) 0.0 - 0.5 %    Neutrophils, Absolute 5.41 1.70 - 7.00 10*3/mm3    Lymphocytes, Absolute 0.64 (L) 0.70 - 3.10 10*3/mm3    Monocytes, Absolute 0.29 0.10 - 0.90 10*3/mm3    Eosinophils, Absolute 0.00 0.00 - 0.40 10*3/mm3    Basophils, Absolute 0.03 0.00 - 0.20 10*3/mm3    Immature Grans, Absolute 0.14 (H) 0.00 - 0.05 10*3/mm3    nRBC 0.0 0.0 - 0.2 /100 WBC   Green Top (Gel)   Result Value Ref Range    Extra Tube Hold for add-ons.    Lavender Top   Result Value Ref Range    Extra Tube hold for add-on    Gold Top - SST   Result Value Ref Range    Extra Tube Hold for add-ons.      XR Chest 2 View   Final Result   Bilateral infiltrative  changes, Covid pneumonitis   with significant worsening, unfavorable change since prior   examination September 8, 2021.      Electronically signed by:  Charlie Browne MD  9/12/2021 12:52 PM CDT   Workstation: [id number]              MDM  Number of Diagnoses or Management Options  Body aches: new and requires workup  SOB (shortness of breath): new and requires workup  Diagnosis management comments: Vital signs are stable, afebrile.  Patient was taken off of oxygen and ambulated on room air.  He was satting 93% with no respiratory difficulty.  Labs are unremarkable.  Chest x-ray shows worsening bilateral infiltrate changes.  Patient is already on antibiotics at home.  Reevaluation he is in no respiratory distress.  Will give steroids here.  Recommend he continue taking his antibiotics at home.  Strict return precautions discussed.  Patient states understanding and is agreeable to the plan.  Recommend PCP follow-up.      Final diagnoses:   SOB (shortness of breath)   Body aches       ED Disposition  ED Disposition     ED Disposition Condition Comment    Discharge Stable           [facility]  [address] [place] [id number] [phone]  Schedule an appointment as soon as possible for a visit in 2 days  ER follow up         Medication List      No changes were made to your prescriptions during this visit.          Jimenez Rivera MD  09/12/21 3810

## 2021-09-12 NOTE — ED NOTES
Pt arrives with complaints of increased sob since testing positive for Covid 9/4/2021.  Pt states his first symptoms began 3-4 days prior, around 9/1/2021.  Pt is A&O X4     Sylvie Davison RN  09/12/21 8143

## 2021-09-13 LAB
ALBUMIN SERPL-MCNC: 3.8 G/DL (ref 3.5–5.2)
ALP SERPL-CCNC: 57 U/L (ref 39–117)
ALT SERPL W P-5'-P-CCNC: 50 U/L (ref 1–41)
AST SERPL-CCNC: 39 U/L (ref 1–40)
BILIRUB CONJ SERPL-MCNC: <0.2 MG/DL (ref 0–0.3)
BILIRUB INDIRECT SERPL-MCNC: ABNORMAL MG/DL
BILIRUB SERPL-MCNC: 0.2 MG/DL (ref 0–1.2)
CREAT SERPL-MCNC: 1 MG/DL (ref 0.76–1.27)
D-DIMER, QUANTITATIVE (MAD,POW, STR): 515 NG/ML (FEU) (ref 0–470)
GFR SERPL CREATININE-BSD FRML MDRD: 85 ML/MIN/1.73
PROT SERPL-MCNC: 7.2 G/DL (ref 6–8.5)
QT INTERVAL: 356 MS
QTC INTERVAL: 405 MS
WHOLE BLOOD HOLD SPECIMEN: NORMAL

## 2021-09-13 PROCEDURE — 82565 ASSAY OF CREATININE: CPT | Performed by: INTERNAL MEDICINE

## 2021-09-13 PROCEDURE — 36415 COLL VENOUS BLD VENIPUNCTURE: CPT | Performed by: INTERNAL MEDICINE

## 2021-09-13 PROCEDURE — 94799 UNLISTED PULMONARY SVC/PX: CPT

## 2021-09-13 PROCEDURE — 80076 HEPATIC FUNCTION PANEL: CPT | Performed by: INTERNAL MEDICINE

## 2021-09-13 PROCEDURE — 85379 FIBRIN DEGRADATION QUANT: CPT | Performed by: INTERNAL MEDICINE

## 2021-09-13 PROCEDURE — 25010000002 ENOXAPARIN PER 10 MG: Performed by: INTERNAL MEDICINE

## 2021-09-13 PROCEDURE — 63710000001 DEXAMETHASONE PER 0.25 MG: Performed by: INTERNAL MEDICINE

## 2021-09-13 RX ADMIN — ALBUTEROL SULFATE 2 PUFF: 90 AEROSOL, METERED RESPIRATORY (INHALATION) at 12:35

## 2021-09-13 RX ADMIN — ALBUTEROL SULFATE 2 PUFF: 90 AEROSOL, METERED RESPIRATORY (INHALATION) at 09:11

## 2021-09-13 RX ADMIN — DEXAMETHASONE 6 MG: 2 TABLET ORAL at 08:24

## 2021-09-13 RX ADMIN — ENOXAPARIN SODIUM 40 MG: 40 INJECTION SUBCUTANEOUS at 02:35

## 2021-09-13 NOTE — H&P
[facility] Medicine Admission      Date of Admission: 9/12/2021      Primary Care Physician: Provider, No Known      Chief Complaint: SOB    HPI:  36M unvaccinated for COVID-19 presents with SOB x 10-12d duration. Pt reports progressive SOB at home. [place] with mild activity now. He has been to the ER a couple of times previously. Sats noted high 80s on RA today, now maintaining with 3LNC. Supp O2 helps alleviate Sx. Sx severity mod to hi. Sx present constantly. Denies prior similar Sx.       Concurrent Medical History:  has no past medical history on file.    Past Surgical History:  has no past surgical history on file.    Family History: family history includes COPD in his mother; Diabetes in his brother; Hypertension in his father and mother.       Social History:  reports that he has never smoked. His smokeless tobacco use includes chew. He reports current alcohol use of about 1.0 standard drinks of alcohol per week. Drug use questions deferred to the physician.    Allergies:   Allergies   Allergen Reactions   • Chocolate Other (See Comments)       Medications:   Prior to Admission medications    Medication Sig Start Date End Date Taking? Authorizing Provider   HYDROcodone-acetaminophen (NORCO) 5-325 MG per tablet Take 1 tablet by mouth Every 6 (Six) Hours As Needed for Moderate Pain  for up to 10 doses. 7/8/19   Curt Abbott MD       Review of Systems:  SOB, fatigue, malaise   Otherwise complete ROS is negative except as mentioned above.    Physical Exam:   Temp:  [97.5 °F (36.4 °C)-98 °F (36.7 °C)] 97.5 °F (36.4 °C)  Heart Rate:  [76-90] 78  Resp:  [20-22] 20  BP: (116-150)/(69-80) 150/76  GEN: nad; a&o x3  HEENT: ncat; perrl  NECK: supple  Neuro: 5/5 str; acutely nonfocal  Skin: no gen rash  CVS: s1s2  Chest: dim bases bilat; EBBS  ABD: soft nt/nd; +BS  EXT: no c/c/e; no acute deformity; +pulses      Results Reviewed:  I have personally reviewed current lab,  radiology, and data and agree with results.  Lab Results (last 24 hours)     Procedure Component Value Units Date/Time    Extra Tubes [[id number]] Collected: 09/12/21 2019    Specimen: Blood, Venous Line Updated: 09/12/21 2130    Narrative:      The following orders were created for panel order Extra Tubes.  Procedure                               Abnormality         Status                     ---------                               -----------         ------                     Lavender Top[[id number]]                                     Final result               Gold [facility] - SST[[id number]]                                   Final result                 Please view results for these tests on the individual orders.    Lavender Top [[id number]] Collected: 09/12/21 2019    Specimen: Blood Updated: 09/12/21 2130     Extra Tube hold for add-on     Comment: Auto resulted       Gold Top - SST [[id number]] Collected: 09/12/21 2019    Specimen: Blood Updated: 09/12/21 2130     Extra Tube Hold for add-ons.     Comment: Auto resulted.       Blood Gas, Arterial - [[id number]]  (Abnormal) Collected: 09/12/21 2056    Specimen: Arterial Blood Updated: 09/12/21 2059     Site Left Radial     Yaron's Test N/A     pH, Arterial 7.453 pH units      Comment: 83 Value above reference range        pCO2, Arterial 36.6 mm Hg      pO2, Arterial 57.3 mm Hg      Comment: 84 Value below reference range        HCO3, Arterial 25.6 mmol/L      Base Excess, Arterial 1.8 mmol/L      O2 Saturation, Arterial 90.9 %      Comment: 84 Value below reference range        Barometric Pressure for Blood Gas 751 mmHg      Modality Room Air     Ventilator Mode NA     Collected by BINU     Comment: Meter: [id number]     :  [id number]       Procalcitonin [[id number]]  (Normal) Collected: 09/12/21 2013    Specimen: Blood Updated: 09/12/21 2045     Procalcitonin 0.13 ng/mL     Narrative:      As a Marker for Sepsis (Non-Neonates):     1. <0.5 ng/mL represents  "a low risk of severe sepsis and/or septic shock.  2. >2 ng/mL represents a high risk of severe sepsis and/or septic shock.    As a Marker for Lower Respiratory Tract Infections that require antibiotic therapy:  PCT on Admission     Antibiotic Therapy             6-12 Hrs later  >0.5                          Strongly Recommended            >0.25 - <0.5             Recommended  0.1 - 0.25                  Discouraged                       Remeasure/reassess PCT  <0.1                         Strongly Discouraged         Remeasure/reassess PCT      As 28 day mortality risk marker: \"Change in Procalcitonin Result\" (>80% or <=80%) if Day 0 (or Day 1) and Day 4 values are available. Refer to http://www.[employer] Services[employer]ct-calculator.com/    Change in PCT <=80 %   A decrease of PCT levels below or equal to 80% defines a positive change in PCT test result representing a higher risk for 28-day all-cause mortality of patients diagnosed with severe sepsis or septic shock.    Change in PCT >80 %   A decrease of PCT levels of more than 80% defines a negative change in PCT result representing a lower risk for 28-day all-cause mortality of patients diagnosed with severe sepsis or septic shock.              Results may be falsely decreased if patient taking Biotin.     D-dimer, Quantitative [[id number]]  (Abnormal) Collected: 09/12/21 2013    Specimen: Blood Updated: 09/12/21 2039     D-Dimer, Quantitative 680 ng/mL (FEU)     Narrative:      Dimer values <500 ng/ml FEU are FDA approved as aid in diagnosis of deep venous thrombosis and pulmonary embolism.  This test should not be used in an exclusion strategy with pretest probability alone.    A recent guideline regarding diagnosis for pulmonary thromboembolism recommends an adjusted exclusion criterion of age x 10 ng/ml FEU for patients >50 years of age ([employer] Intern Med 2015; 163: 701-711).          Imaging Results (Last 24 Hours)     Procedure Component Value Units Date/Time    CT Angiogram " Chest [[id number]] Resulted: 09/12/21 2124     Updated: 09/12/21 2133            Assessment:    36M COVID 19  Acute hypoxemic resp failure        Plan:  Admit to   supp O2, wean as abner  Dexamethasone  remdesivir  Not presently a candidate for Actemra  LMWH DVT proph  meds rev  supp care  Follow labs    I discussed the patient's findings and my recommendations with: Pt, other MD, staff    Joaquim Rae Jr, MD

## 2021-09-13 NOTE — ED PROVIDER NOTES
"Subjective   37yo male no significant pmh presents ED c/o 12d hx soa/draper/nonproductive cough/dysguesia/myalgias/malaise/diarrhea.  Pt seen earlier this month urgent care with covid-19 (+) PCR.  Pt seen [facility] ER earlier this date c/o progressive soa with unremarkable laboratory evaluation, cxr demonstrating bilateral infiltrates c/w covid 19 pneumonitis.  Pt states home sao2 88%; therefore, self referred back to ED for evaluation.      History provided by:  Patient  Shortness of Breath  Severity:  Severe  Onset quality:  Gradual  Duration:  12 days  Associated symptoms: cough    Associated symptoms: no abdominal pain and no vomiting        Review of Systems   Constitutional: Positive for fatigue.   HENT: Positive for congestion.    Eyes: Negative for redness.   Respiratory: Positive for cough and shortness of breath.    Cardiovascular: Negative.    Gastrointestinal: Positive for diarrhea and nausea. Negative for abdominal pain and vomiting.   Genitourinary: Negative.    Musculoskeletal: Positive for myalgias.   Allergic/Immunologic: Negative for immunocompromised state.   All other systems reviewed and are negative.      No past medical history on file.    Allergies   Allergen Reactions   • Chocolate Other (See Comments)       No past surgical history on file.    Family History   Problem Relation Age of Onset   • Hypertension Mother    • COPD Mother    • Hypertension Father    • Diabetes Brother        Social History     Socioeconomic History   • Marital status:      Spouse name: Not on file   • Number of children: Not on file   • Years of education: Not on file   • Highest education level: Not on file   Tobacco Use   • Smoking status: Never Smoker   • Smokeless tobacco: Current User     Types: Chew   Substance and Sexual Activity   • Alcohol use: Yes     Alcohol/week: 1.0 standard drinks     Types: 1 Shots of liquor per week     Comment: \"one coctail a day\"   • Drug use: Defer   • Sexual activity: Defer "           Objective   Physical Exam  Vitals and nursing note reviewed.   Constitutional:       Appearance: He is well-developed.   HENT:      Head: Normocephalic and atraumatic.   Eyes:      Pupils: Pupils are equal, round, and reactive to light.   Neck:      Vascular: No JVD.   Cardiovascular:      Rate and Rhythm: Normal rate and regular rhythm.      Pulses: Normal pulses.      Heart sounds: Normal heart sounds. No murmur heard.   No friction rub. No gallop.    Pulmonary:      Effort: Pulmonary effort is normal. No accessory muscle usage or respiratory distress.      Breath sounds: Normal breath sounds. No stridor. No decreased breath sounds, wheezing, rhonchi or rales.   Abdominal:      Palpations: Abdomen is soft.      Tenderness: There is no abdominal tenderness. There is no guarding or rebound.   Musculoskeletal:         General: Normal range of motion.      Cervical back: Normal range of motion and neck supple.      Right lower leg: No edema.      Left lower leg: No edema.   Lymphadenopathy:      Cervical: No cervical adenopathy.   Skin:     General: Skin is warm and dry.   Neurological:      General: No focal deficit present.      Mental Status: He is alert and oriented to person, place, and time.         Procedures           ED Course  ED Course as of Sep 12 2148   Sun Sep 12, 2021   2015 CBC/CMP/Troponin/Bnp reviewed earlier this date within normal limits. See labs.    [SD]      ED Course User Index  [SD] Oleg Gupta MD      Labs Reviewed   D-DIMER, QUANTITATIVE - Abnormal; Notable for the following components:       Result Value    D-Dimer, Quantitative 680 (*)     All other components within normal limits    Narrative:     Dimer values <500 ng/ml FEU are FDA approved as aid in diagnosis of deep venous thrombosis and pulmonary embolism.  This test should not be used in an exclusion strategy with pretest probability alone.    A recent guideline regarding diagnosis for pulmonary thromboembolism  "recommends an adjusted exclusion criterion of age x 10 ng/ml FEU for patients >50 years of age (Karen Intern Med 2015; 163: 701-711).     BLOOD GAS, ARTERIAL - Abnormal; Notable for the following components:    pH, Arterial 7.453 (*)     pO2, Arterial 57.3 (*)     O2 Saturation, Arterial 90.9 (*)     All other components within normal limits   PROCALCITONIN - Normal    Narrative:     As a Marker for Sepsis (Non-Neonates):     1. <0.5 ng/mL represents a low risk of severe sepsis and/or septic shock.  2. >2 ng/mL represents a high risk of severe sepsis and/or septic shock.    As a Marker for Lower Respiratory Tract Infections that require antibiotic therapy:  PCT on Admission     Antibiotic Therapy             6-12 Hrs later  >0.5                          Strongly Recommended            >0.25 - <0.5             Recommended  0.1 - 0.25                  Discouraged                       Remeasure/reassess PCT  <0.1                         Strongly Discouraged         Remeasure/reassess PCT      As 28 day mortality risk marker: \"Change in Procalcitonin Result\" (>80% or <=80%) if Day 0 (or Day 1) and Day 4 values are available. Refer to http://www.[employer]-pct-calculator.com/    Change in PCT <=80 %   A decrease of PCT levels below or equal to 80% defines a positive change in PCT test result representing a higher risk for 28-day all-cause mortality of patients diagnosed with severe sepsis or septic shock.    Change in PCT >80 %   A decrease of PCT levels of more than 80% defines a negative change in PCT result representing a lower risk for 28-day all-cause mortality of patients diagnosed with severe sepsis or septic shock.              Results may be falsely decreased if patient taking Biotin.    BLOOD CULTURE   BLOOD CULTURE   BLOOD GAS, ARTERIAL   EXTRA TUBES    Narrative:     The following orders were created for panel order Extra Tubes.  Procedure                               Abnormality         Status                   "   ---------                               -----------         ------                     Jaelyn Top[[id number]]                                     Final result               [facility][[id number]]                                   Final result                 Please view results for these tests on the individual orders.   LAVENDER TOP   GOLD TOP - SST     XR Chest 2 View    Result Date: 9/12/2021  Narrative: Chest x-ray PA and lateral CLINICAL INDICATION: Shortness of breath. Covid positive pneumonia. COMPARISON: Chest x-ray September 8, 2021 FINDINGS: Cardiac silhouette is normal in size. Pulmonary vascularity is unremarkable. Bilateral infiltrative changes, pneumonitis with significant worsening, unfavorable change since prior examination September 8, 2021.     Impression: Bilateral infiltrative changes, Covid pneumonitis with significant worsening, unfavorable change since prior examination September 8, 2021. Electronically signed by:  Charlie Browne MD  9/12/2021 12:52 PM CDT Workstation: [id number]                                         [facility]    Final diagnoses:   Pneumonia due to COVID-19 virus   Hypoxia       ED Disposition  ED Disposition     ED Disposition Condition Comment    Decision to Admit  Level of Care: Med/Surg [1]   Admitting Physician: ASHLEY ZARAGOZA JR [[id number]]   Attending Physician: ASHLEY ZARAGOZA JR [[id number]]   Patient Class: Inpatient [101]            No follow-up provider specified.       Medication List      No changes were made to your prescriptions during this visit.          Oleg Gupta MD  09/12/21 5588

## 2021-09-13 NOTE — PLAN OF CARE
Goal Outcome Evaluation:  Plan of Care Reviewed With: patient        Progress: no change  Outcome Summary: new admission

## 2021-09-13 NOTE — ED NOTES
This RN was notified that pts O2 was 83% on 5L nasal cannula with a good wave form. Pt was bumped up to 7L nasal cannula and after a few minutes pts O2 only got up to 88%. Pt was placed on 15L non-rebreather, pts O2 is now 94%.      Edda Sethi RN  09/13/21 1757

## 2021-09-13 NOTE — PROGRESS NOTES
[facility] Medicine Services  INPATIENT PROGRESS NOTE    Length of Stay: 1  Date of Admission: 9/12/2021  Primary Care Physician: Provider, No Known    Subjective   Chief Complaint:  Shortness of breath   HPI:  Patient states that as long as he is still, he is ok.  He states that if he moves at all he gets significantly short of breath and begins coughing.    Review of Systems   Constitutional: Positive for activity change, appetite change and fatigue. Negative for chills and fever.   Respiratory: Positive for cough and shortness of breath. Negative for chest tightness.    Cardiovascular: Negative for chest pain, palpitations and leg swelling.   Gastrointestinal: Negative for abdominal pain, constipation, diarrhea, nausea and vomiting.   Skin: Negative for wound.   Neurological: Negative for dizziness, weakness, light-headedness, numbness and headaches.        All pertinent negatives and positives are as above. All other systems have been reviewed and are negative unless otherwise stated.     Objective    Temp:  [97.5 °F (36.4 °C)] 97.5 °F (36.4 °C)  Heart Rate:  [58-82] 67  Resp:  [13-22] 16  BP: (107-150)/(69-84) 122/75    Physical Exam  Vitals reviewed.   Constitutional:       Appearance: He is well-developed.   HENT:      Head: Normocephalic and atraumatic.   Eyes:      Pupils: Pupils are equal, round, and reactive to light.   Cardiovascular:      Rate and Rhythm: Normal rate and regular rhythm.      Heart sounds: Normal heart sounds. No murmur heard.   No friction rub. No gallop.    Pulmonary:      Effort: Pulmonary effort is normal. No respiratory distress.      Breath sounds: Decreased breath sounds present. No wheezing or rales.   Chest:      Chest wall: No tenderness.   Abdominal:      General: Bowel sounds are normal. There is no distension.      Palpations: Abdomen is soft.      Tenderness: There is no abdominal tenderness.   Musculoskeletal:      Cervical back:  Normal range of motion and neck supple.   Psychiatric:         Mood and Affect: Mood is anxious.         Behavior: Behavior normal.         Results Review:  I have reviewed the labs, radiology results, and diagnostic studies.    Laboratory Data:   Results from last 7 days   Lab Units 09/13/21  1341 09/12/21 2013 09/12/21  1227   SODIUM mmol/L  --  137 135*   POTASSIUM mmol/L  --  5.1 4.7   CHLORIDE mmol/L  --  98 97*   CO2 mmol/L  --  25.0 27.0   BUN mg/dL  --  20 22*   CREATININE mg/dL 1.00 1.05  1.05 1.14   GLUCOSE mg/dL  --  118* 95   CALCIUM mg/dL  --  8.8 9.1   BILIRUBIN mg/dL 0.2 0.3  0.3 0.3   ALK PHOS U/L 57 57  57 57   ALT (SGPT) U/L 50* 50*  50* 40   AST (SGOT) U/L 39 63*  63* 45*   ANION GAP mmol/L  --  14.0 11.0     Estimated Creatinine Clearance: 111.1 mL/min (by C-G formula based on SCr of 1 mg/dL).          Results from last 7 days   Lab Units 09/12/21 2019 09/12/21  1227   WBC 10*3/mm3 5.97 6.51   HEMOGLOBIN g/dL 13.9 14.0   HEMATOCRIT % 41.9 42.9   PLATELETS 10*3/mm3 266 228           Culture Data:   No results found for: BLOODCX  No results found for: URINECX  No results found for: RESPCX  No results found for: WOUNDCX  No results found for: STOOLCX  No components found for: BODYFLD    Radiology Data:   Imaging Results (Last 24 Hours)     Procedure Component Value Units Date/Time    CT Angiogram Chest [[id number]] Collected: 09/12/21 2120     Updated: 09/12/21 2217    Narrative:      EXAM:    CT Angiography Chest With Intravenous Contrast    CLINICAL HISTORY:    The patient is 36 years old and is Male; d dimer (+)/soa    TECHNIQUE:    Axial computed tomographic angiography images of the chest with  intravenous contrast.  Sagittal and coronal reformatted images  were created and reviewed.  This CT exam was performed using one  or more of the following dose reduction techniques:  automated  exposure control, adjustment of the mA and/or kV according to  patient size, and/or use of iterative  reconstruction technique.    MIP reconstructed images were created and reviewed.    COMPARISON:    No relevant prior studies available.    FINDINGS:    PULMONARY ARTERIES:  There are no obvious filling defects  identified within the pulmonary arteries to suggest pulmonary  embolism.    AORTA:  No acute findings.  No thoracic aortic aneurysm.    LUNGS:  Diffuse groundglass opacities are present throughout  the lungs. Smooth interlobular thickening is noted.    PLEURAL SPACE:  Unremarkable.  No significant effusion.  No  pneumothorax.    HEART:  Unremarkable.  No cardiomegaly.  No significant  pericardial effusion.  No evidence of RV dysfunction.    MEDIASTINUM:  The tracheobronchial tree is widely patent.    BONES/JOINTS:  No acute fracture.  No dislocation.    SOFT TISSUES:  Unremarkable.    LYMPH NODES:  Unremarkable.  No enlarged lymph nodes.      Impression:      1.  Diffuse groundglass opacities.  Commonly reported imaging  features of (COVID-19 or viral) pneumonia are present. Other  processes such as influenza pneumonia and organizing pneumonia,  as can be seen with drug toxicity and connective tissue disease,  can cause a similar imaging pattern. [id number]  2.  No evidence of pulmonary embolism.    Electronically signed by:  Genet Richard MD  9/12/2021 10:16 PM  CDT Workstation: [id number]          I have reviewed the patient's current medications.     Assessment/Plan     Active Hospital Problems    Diagnosis    • Pneumonia due to COVID-19 virus        Plan:  1.  Acute hypoxic respiratory failure: Secondary to COVID-19 pneumonia.  Currently requiring 10 L per high flow nasal cannula.  Oxygen saturation acceptable.  CTA shows no PE.  2.  COVID-19 pneumonia: Continue dexamethasone, remdesivir, and bronchodilators.  Follow inflammatory labs.  3.  DVT prophylaxis: Lovenox.    The patient was evaluated during the global COVID-19 pandemic, and the diagnosis was suspected/considered upon their initial presentation.   Evaluation, treatment, and testing were consistent with current guidelines for patients who present with complaints or symptoms that may be related to COVID-19.    I confirmed that the patient's Advance Care Plan is present, code status is documented, or surrogate decision maker is listed in the patient's medical record.       Discharge Planning: I expect patient to be discharged to home in 4-5 days.        This document has been electronically signed by Cuco Darden MD on September 13, 2021 15:10 CDT

## 2021-09-13 NOTE — PAYOR COMM NOTE
"STEFANO JOHN RN, [facility] PHONE [phone]  CM [phone]  INPATIENT ADMISSION        Sasha De La Vega (36 y.o. Male)     Date of Birth Social Security Number Address Home Phone MRN    1985  [address]  [facility] [address] [phone] [id number]    Holiness Marital Status          Yazidi        Admission Date Admission Type Admitting Provider Attending Provider Department, Room/Bed    9/12/21 Emergency Joaquim Rae Jr., MD [place], Sean M, MD [facility] EMERGENCY DEPARTMENT, 12/12    Discharge Date Discharge Disposition Discharge Destination                       Attending Provider: Oleg Gupta MD    Allergies: Chocolate    Isolation: None   Infection: COVID (confirmed) (09/12/21)   Code Status: Prior    Ht: 175.3 cm (69\")   Wt: 86.2 kg (190 lb)    Admission Cmt: None   Principal Problem: COVID PNEUMONIA                Active Insurance as of 9/12/2021     Primary Coverage     Payor Plan Insurance Group Employer/Plan Group    [employer] [place] [employer] [place]ICA[employer]      Payor Plan Address Payor Plan Phone Number Payor Plan Fax Number Effective Dates    [address] [address] [phone]  9/12/2021 - None Entered    [facility] [id number]       Subscriber Name Subscriber Birth Date Member ID       SASHA DE LA VEGA 1985 [id number]                 Emergency Contacts      (Rel.) Home Phone Work Phone Mobile Phone    MARTÍN BELCHER (Significant Other) [phone] -- [phone]            History & Physical    No notes of this type exist for this encounter.            Emergency Department Notes      Oleg Gupta MD at 09/12/21 2011          Subjective   37yo male no significant pmh presents ED c/o 12d hx soa/draper/nonproductive cough/dysguesia/myalgias/malaise/diarrhea.  Pt seen earlier this month urgent care with covid-19 (+) PCR.  Pt seen [facility] ER earlier this date c/o progressive soa with unremarkable laboratory " "evaluation, cxr demonstrating bilateral infiltrates c/w covid 19 pneumonitis.  Pt states home sao2 88%; therefore, self referred back to ED for evaluation.      History provided by:  Patient  Shortness of Breath  Severity:  Severe  Onset quality:  Gradual  Duration:  12 days  Associated symptoms: cough    Associated symptoms: no abdominal pain and no vomiting        Review of Systems   Constitutional: Positive for fatigue.   HENT: Positive for congestion.    Eyes: Negative for redness.   Respiratory: Positive for cough and shortness of breath.    Cardiovascular: Negative.    Gastrointestinal: Positive for diarrhea and nausea. Negative for abdominal pain and vomiting.   Genitourinary: Negative.    Musculoskeletal: Positive for myalgias.   Allergic/Immunologic: Negative for immunocompromised state.   All other systems reviewed and are negative.      No past medical history on file.    Allergies   Allergen Reactions   • Chocolate Other (See Comments)       No past surgical history on file.    Family History   Problem Relation Age of Onset   • Hypertension Mother    • COPD Mother    • Hypertension Father    • Diabetes Brother        Social History     Socioeconomic History   • Marital status:      Spouse name: Not on file   • Number of children: Not on file   • Years of education: Not on file   • Highest education level: Not on file   Tobacco Use   • Smoking status: Never Smoker   • Smokeless tobacco: Current User     Types: Chew   Substance and Sexual Activity   • Alcohol use: Yes     Alcohol/week: 1.0 standard drinks     Types: 1 Shots of liquor per week     Comment: \"one coctail a day\"   • Drug use: Defer   • Sexual activity: Defer           Objective   Physical Exam  Vitals and nursing note reviewed.   Constitutional:       Appearance: He is well-developed.   HENT:      Head: Normocephalic and atraumatic.   Eyes:      Pupils: Pupils are equal, round, and reactive to light.   Neck:      Vascular: No JVD. "   Cardiovascular:      Rate and Rhythm: Normal rate and regular rhythm.      Pulses: Normal pulses.      Heart sounds: Normal heart sounds. No murmur heard.   No friction rub. No gallop.    Pulmonary:      Effort: Pulmonary effort is normal. No accessory muscle usage or respiratory distress.      Breath sounds: Normal breath sounds. No stridor. No decreased breath sounds, wheezing, rhonchi or rales.   Abdominal:      Palpations: Abdomen is soft.      Tenderness: There is no abdominal tenderness. There is no guarding or rebound.   Musculoskeletal:         General: Normal range of motion.      Cervical back: Normal range of motion and neck supple.      Right lower leg: No edema.      Left lower leg: No edema.   Lymphadenopathy:      Cervical: No cervical adenopathy.   Skin:     General: Skin is warm and dry.   Neurological:      General: No focal deficit present.      Mental Status: He is alert and oriented to person, place, and time.         Procedures          ED Course  ED Course as of Sep 12 2148   Sun Sep 12, 2021   2015 CBC/CMP/Troponin/Bnp reviewed earlier this date within normal limits. See labs.    [SD]      ED Course User Index  [SD] Oleg Gupta MD      Labs Reviewed   D-DIMER, QUANTITATIVE - Abnormal; Notable for the following components:       Result Value    D-Dimer, Quantitative 680 (*)     All other components within normal limits    Narrative:     Dimer values <500 ng/ml FEU are FDA approved as aid in diagnosis of deep venous thrombosis and pulmonary embolism.  This test should not be used in an exclusion strategy with pretest probability alone.    A recent guideline regarding diagnosis for pulmonary thromboembolism recommends an adjusted exclusion criterion of age x 10 ng/ml FEU for patients >50 years of age (Karen Intern Med 2015; 163: 701-711).     BLOOD GAS, ARTERIAL - Abnormal; Notable for the following components:    pH, Arterial 7.453 (*)     pO2, Arterial 57.3 (*)     O2 Saturation, Arterial  "90.9 (*)     All other components within normal limits   PROCALCITONIN - Normal    Narrative:     As a Marker for Sepsis (Non-Neonates):     1. <0.5 ng/mL represents a low risk of severe sepsis and/or septic shock.  2. >2 ng/mL represents a high risk of severe sepsis and/or septic shock.    As a Marker for Lower Respiratory Tract Infections that require antibiotic therapy:  PCT on Admission     Antibiotic Therapy             6-12 Hrs later  >0.5                          Strongly Recommended            >0.25 - <0.5             Recommended  0.1 - 0.25                  Discouraged                       Remeasure/reassess PCT  <0.1                         Strongly Discouraged         Remeasure/reassess PCT      As 28 day mortality risk marker: \"Change in Procalcitonin Result\" (>80% or <=80%) if Day 0 (or Day 1) and Day 4 values are available. Refer to http://www.[facility]teeAmerican [facility] AssociationFlashtalkingpct-calculator.com/    Change in PCT <=80 %   A decrease of PCT levels below or equal to 80% defines a positive change in PCT test result representing a higher risk for 28-day all-cause mortality of patients diagnosed with severe sepsis or septic shock.    Change in PCT >80 %   A decrease of PCT levels of more than 80% defines a negative change in PCT result representing a lower risk for 28-day all-cause mortality of patients diagnosed with severe sepsis or septic shock.              Results may be falsely decreased if patient taking Biotin.    BLOOD CULTURE   BLOOD CULTURE   BLOOD GAS, ARTERIAL   EXTRA TUBES    Narrative:     The following orders were created for panel order Extra Tubes.  Procedure                               Abnormality         Status                     ---------                               -----------         ------                     Lavender Top[[id number]]                                     Final result               [facility][[id number]]                                   Final result                 Please view results " for these tests on the individual orders.   LAVENDER TOP   GOLD TOP - SST     XR Chest 2 View    Result Date: 9/12/2021  Narrative: Chest x-ray PA and lateral CLINICAL INDICATION: Shortness of breath. Covid positive pneumonia. COMPARISON: Chest x-ray September 8, 2021 FINDINGS: Cardiac silhouette is normal in size. Pulmonary vascularity is unremarkable. Bilateral infiltrative changes, pneumonitis with significant worsening, unfavorable change since prior examination September 8, 2021.     Impression: Bilateral infiltrative changes, Covid pneumonitis with significant worsening, unfavorable change since prior examination September 8, 2021. Electronically signed by:  Cahrlie Browne MD  9/12/2021 12:52 PM CDT Workstation: [id number]                                         [facility]    Final diagnoses:   Pneumonia due to COVID-19 virus   Hypoxia       ED Disposition  ED Disposition     ED Disposition Condition Comment    Decision to Admit  Level of Care: Med/Surg [1]   Admitting Physician: ASHLEY ZARAGOZA JR [[id number]]   Attending Physician: ASHLEY ZARAGOZA JR [[id number]]   Patient Class: Inpatient [101]            No follow-up provider specified.       Medication List      No changes were made to your prescriptions during this visit.          Oleg Gupta MD  09/12/21 2148      Electronically signed by Oleg Gupta MD at 09/12/21 2148       Physician Progress Notes (last 24 hours) (Notes from 09/11/21 2155 through 09/12/21 2155)    No notes of this type exist for this encounter.

## 2021-09-14 LAB
ALBUMIN SERPL-MCNC: 3.7 G/DL (ref 3.5–5.2)
ALBUMIN/GLOB SERPL: 1.1 G/DL
ALP SERPL-CCNC: 59 U/L (ref 39–117)
ALT SERPL W P-5'-P-CCNC: 60 U/L (ref 1–41)
ANION GAP SERPL CALCULATED.3IONS-SCNC: 10 MMOL/L (ref 5–15)
AST SERPL-CCNC: 42 U/L (ref 1–40)
BASOPHILS # BLD AUTO: 0.03 10*3/MM3 (ref 0–0.2)
BASOPHILS NFR BLD AUTO: 0.2 % (ref 0–1.5)
BILIRUB CONJ SERPL-MCNC: <0.2 MG/DL (ref 0–0.3)
BILIRUB SERPL-MCNC: 0.3 MG/DL (ref 0–1.2)
BUN SERPL-MCNC: 26 MG/DL (ref 6–20)
BUN/CREAT SERPL: 24.3 (ref 7–25)
CALCIUM SPEC-SCNC: 9.6 MG/DL (ref 8.6–10.5)
CHLORIDE SERPL-SCNC: 99 MMOL/L (ref 98–107)
CO2 SERPL-SCNC: 28 MMOL/L (ref 22–29)
CREAT SERPL-MCNC: 1.07 MG/DL (ref 0.76–1.27)
CRP SERPL-MCNC: 5.95 MG/DL (ref 0–0.5)
DEPRECATED RDW RBC AUTO: 42.4 FL (ref 37–54)
EOSINOPHIL # BLD AUTO: 0 10*3/MM3 (ref 0–0.4)
EOSINOPHIL NFR BLD AUTO: 0 % (ref 0.3–6.2)
ERYTHROCYTE [DISTWIDTH] IN BLOOD BY AUTOMATED COUNT: 13.5 % (ref 12.3–15.4)
GFR SERPL CREATININE-BSD FRML MDRD: 78 ML/MIN/1.73
GLOBULIN UR ELPH-MCNC: 3.3 GM/DL
GLUCOSE SERPL-MCNC: 131 MG/DL (ref 65–99)
HCT VFR BLD AUTO: 41.6 % (ref 37.5–51)
HGB BLD-MCNC: 13.9 G/DL (ref 13–17.7)
IMM GRANULOCYTES # BLD AUTO: 0.37 10*3/MM3 (ref 0–0.05)
IMM GRANULOCYTES NFR BLD AUTO: 2.5 % (ref 0–0.5)
LYMPHOCYTES # BLD AUTO: 0.71 10*3/MM3 (ref 0.7–3.1)
LYMPHOCYTES NFR BLD AUTO: 4.8 % (ref 19.6–45.3)
MCH RBC QN AUTO: 28.7 PG (ref 26.6–33)
MCHC RBC AUTO-ENTMCNC: 33.4 G/DL (ref 31.5–35.7)
MCV RBC AUTO: 86 FL (ref 79–97)
MONOCYTES # BLD AUTO: 0.9 10*3/MM3 (ref 0.1–0.9)
MONOCYTES NFR BLD AUTO: 6.1 % (ref 5–12)
NEUTROPHILS NFR BLD AUTO: 12.74 10*3/MM3 (ref 1.7–7)
NEUTROPHILS NFR BLD AUTO: 86.4 % (ref 42.7–76)
NRBC BLD AUTO-RTO: 0 /100 WBC (ref 0–0.2)
PLATELET # BLD AUTO: 356 10*3/MM3 (ref 140–450)
PMV BLD AUTO: 9.8 FL (ref 6–12)
POTASSIUM SERPL-SCNC: 5.2 MMOL/L (ref 3.5–5.2)
PROT SERPL-MCNC: 7 G/DL (ref 6–8.5)
RBC # BLD AUTO: 4.84 10*6/MM3 (ref 4.14–5.8)
SODIUM SERPL-SCNC: 137 MMOL/L (ref 136–145)
WBC # BLD AUTO: 14.75 10*3/MM3 (ref 3.4–10.8)

## 2021-09-14 PROCEDURE — 85025 COMPLETE CBC W/AUTO DIFF WBC: CPT | Performed by: INTERNAL MEDICINE

## 2021-09-14 PROCEDURE — 94799 UNLISTED PULMONARY SVC/PX: CPT

## 2021-09-14 PROCEDURE — 80053 COMPREHEN METABOLIC PANEL: CPT | Performed by: INTERNAL MEDICINE

## 2021-09-14 PROCEDURE — 82248 BILIRUBIN DIRECT: CPT | Performed by: INTERNAL MEDICINE

## 2021-09-14 PROCEDURE — 25010000002 ENOXAPARIN PER 10 MG: Performed by: INTERNAL MEDICINE

## 2021-09-14 PROCEDURE — 94760 N-INVAS EAR/PLS OXIMETRY 1: CPT

## 2021-09-14 PROCEDURE — 86140 C-REACTIVE PROTEIN: CPT | Performed by: INTERNAL MEDICINE

## 2021-09-14 PROCEDURE — 25010000002 DEXAMETHASONE PER 1 MG: Performed by: INTERNAL MEDICINE

## 2021-09-14 RX ADMIN — DEXAMETHASONE SODIUM PHOSPHATE 6 MG: 4 INJECTION, SOLUTION INTRAMUSCULAR; INTRAVENOUS at 10:16

## 2021-09-14 RX ADMIN — ALBUTEROL SULFATE 2 PUFF: 90 AEROSOL, METERED RESPIRATORY (INHALATION) at 12:25

## 2021-09-14 RX ADMIN — ALBUTEROL SULFATE 2 PUFF: 90 AEROSOL, METERED RESPIRATORY (INHALATION) at 19:14

## 2021-09-14 RX ADMIN — ALBUTEROL SULFATE 2 PUFF: 90 AEROSOL, METERED RESPIRATORY (INHALATION) at 08:11

## 2021-09-14 RX ADMIN — ENOXAPARIN SODIUM 40 MG: 40 INJECTION SUBCUTANEOUS at 01:13

## 2021-09-14 NOTE — PLAN OF CARE
Goal Outcome Evaluation:           Progress: no change  Outcome Summary: pt desats upon exertion. has not slept thoughout the night. will continue to monitor.

## 2021-09-14 NOTE — PLAN OF CARE
Problem: Adult Inpatient Plan of Care  Goal: Plan of Care Review  Outcome: Ongoing, Progressing  Flowsheets (Taken 9/14/2021 1141)  Plan of Care Reviewed With: patient  Outcome Summary: pt's appetite and intake are improving. will add milk and boost plus tid with meals to supplement diet. RDN staff will continue to monitor.   Goal Outcome Evaluation:  Plan of Care Reviewed With: patient           Outcome Summary: pt's appetite and intake are improving. will add milk and boost plus tid with meals to supplement diet. RDN staff will continue to monitor.

## 2021-09-14 NOTE — PLAN OF CARE
Goal Outcome Evaluation:  Plan of Care Reviewed With: patient        Progress: improving  Outcome Summary: Vss, pt feels much better today, will cont to monitor.

## 2021-09-14 NOTE — CASE MANAGEMENT/SOCIAL WORK
Discharge Planning Assessment  [facility]     Patient Name: Lior Piper  MRN: [id number]  Today's Date: 9/14/2021    Admit Date: 9/12/2021    Discharge Needs Assessment     Row Name 09/14/21 1426       Living Environment    Lives With  significant other;child(torsten), dependent    Name(s) of Who Lives With Patient  Susan Brito    Current Living Arrangements  home/apartment/condo    Primary Care Provided by  self    Provides Primary Care For  child(torsten)    Family Caregiver if Needed  significant other    Quality of Family Relationships  helpful;involved;supportive    Able to Return to Prior Arrangements  yes       Resource/Environmental Concerns    Resource/Environmental Concerns  none    Transportation Concerns  car, none       Transition Planning    Patient/Family Anticipates Transition to  home with family    Patient/Family Anticipated Services at Transition  none    Transportation Anticipated  family or friend will provide       Discharge Needs Assessment    Readmission Within the Last 30 Days  no previous admission in last 30 days    Equipment Currently Used at Home  none    Concerns to be Addressed  no discharge needs identified    Anticipated Changes Related to Illness  none    Equipment Needed After Discharge  oxygen        Discharge Plan     Row Name 09/14/21 1431       Plan    Plan Comments  LACE complete.  Pharmacy and coverage confirmed.  Patient denies use of any DME at home.  No DME anticipated.  Patient is open to home health if recommended.  Patient reports a good support system.  Patient's significant other will provide transportation at discharge... MAGY Azar        Continued Care and Services - Admitted Since 9/12/2021    Coordination has not been started for this encounter.         Demographic Summary     Row Name 09/14/21 1431       General Information    Admission Type  inpatient    Arrived From  emergency department    Referral Source  high risk screening     Preferred Language  English     Used During This Interaction  no        Functional Status     Row Name 09/14/21 1426       Functional Status    Usual Activity Tolerance  good    Current Activity Tolerance  moderate       Functional Status, IADL    Medications  independent    Meal Preparation  independent    Housekeeping  independent    Laundry  independent    Shopping  independent       Mental Status Summary    Recent Changes in Mental Status/Cognitive Functioning  no changes       Employment/    Employment Status  unemployed        Psychosocial    No documentation.       Abuse/Neglect    No documentation.       Legal    No documentation.       Substance Abuse    No documentation.       Patient Forms    No documentation.           MAGY Azar

## 2021-09-14 NOTE — CONSULTS
Adult Nutrition  Assessment    Patient Name:  Lior Piper  YOB: 1985  MRN: [id number]  Admit Date:  9/12/2021    Assessment Date:  9/14/2021    Comments:  Pt was admitted with covid 19-body aches and shortness of breath. Spoke with pt by telephone due to the pandemic restrictions. He said his appetite and intake is improving. Ate 75% of his breakfast today per nursing records. No weight loss even during his illness he reports. Glucose , bun, liver labs and wbc are elevated. Pt agreed to milk with all meals and boost plus with all meals to supplement diet. He denies difficulty chewing, swallowing, gi issues, etc. Will honor food pref, provide a menu and continue to monitor/note acceptance of oral supplements. RDN staff will continue to monitor.    Reason for Assessment     Row Name 09/14/21 1118          Reason for Assessment    Reason For Assessment  physician consult     Diagnosis  pulmonary disease     Identified At Risk by Screening Criteria  reduced oral intake over the last month         Nutrition/Diet History     Row Name 09/14/21 1118          Nutrition/Diet History    Typical Food/Fluid Intake  pt said his appetite and intake is getting better. agreed to milk and boost with meals to supplement diet.     Food Preferences  allergy to chocolate.     Factors Affecting Nutritional Intake  anorexia improving.           Labs/Tests/Procedures/Meds     Row Name 09/14/21 1119          Labs/Procedures/Meds    Lab Results Reviewed  reviewed, pertinent        Medications    Pertinent Medications Reviewed  reviewed, pertinent         Physical Findings     Row Name 09/14/21 1120          Physical Findings    Overall Physical Appearance  overweight         Estimated/Assessed Needs     Row Name 09/14/21 1120          Calculation Measurements    Weight Used For Calculations  86.2 kg (190 lb 0.6 oz)        Estimated/Assessed Needs    Additional Documentation  Calorie Requirements (Group);Protein  Requirements (Group);Fluid Requirements (Group);Honaker-St. Jeor Equation (Group)        Calorie Requirements    Weight Used For Calorie Calculations  86.2 kg (190 lb 0.6 oz)     Estimated Calorie Need Method  Honaker-St Jeor        Honaker-St. Jeor Equation    RMR (Honaker-St. Jeor Equation)  1782.375     Honaker-St. Jeor Activity Factors  1.4 - 1.5     Activity Factors (Honaker-St. Jeor)  9215.325 - 7991.5615        Protein Requirements    Weight Used For Protein Calculations  86.2 kg (190 lb 0.6 oz)     Est Protein Requirement Amount (gms/kg)  1.2 gm protein     Estimated Protein Requirements (gms/day)  103.44        Fluid Requirements    Fluid Requirements (mL/day)  2500     RDA Method (mL)  2500         Nutrition Prescription Ordered     Row Name 09/14/21 1121          Nutrition Prescription PO    Current PO Diet  Regular     Supplement  Milk;Boost Plus (Ensure Enlive, Ensure Plus)     Supplement Frequency  3 times a day         Evaluation of Received Nutrient/Fluid Intake     Row Name 09/14/21 1128 09/14/21 1121       PO Evaluation    Number of Meals  1  1    % PO Intake  --  75              Electronically signed by:  Sarai Valdez, MONI  09/14/21 11:29 CDT

## 2021-09-14 NOTE — PROGRESS NOTES
[facility] Medicine Services  INPATIENT PROGRESS NOTE    Length of Stay: 2  Date of Admission: 9/12/2021  Primary Care Physician: Provider, No Known    Subjective   Chief Complaint:  Shortness of breath   HPI: Patient states that he did not sleep well overnight, but is breathing some better today.    Review of Systems   Constitutional: Positive for activity change, appetite change and fatigue. Negative for chills and fever.   Respiratory: Positive for cough and shortness of breath. Negative for chest tightness.    Cardiovascular: Negative for chest pain, palpitations and leg swelling.   Gastrointestinal: Negative for abdominal pain, constipation, diarrhea, nausea and vomiting.   Skin: Negative for wound.   Neurological: Negative for dizziness, weakness, light-headedness, numbness and headaches.        All pertinent negatives and positives are as above. All other systems have been reviewed and are negative unless otherwise stated.     Objective    Temp:  [96.7 °F (35.9 °C)-97.7 °F (36.5 °C)] 96.7 °F (35.9 °C)  Heart Rate:  [52-74] 57  Resp:  [18-20] 20  BP: (113-129)/(60-76) 129/60    Physical Exam  Vitals reviewed.   Constitutional:       Appearance: He is well-developed.   HENT:      Head: Normocephalic and atraumatic.   Eyes:      Pupils: Pupils are equal, round, and reactive to light.   Cardiovascular:      Rate and Rhythm: Normal rate and regular rhythm.      Heart sounds: Normal heart sounds. No murmur heard.   No friction rub. No gallop.    Pulmonary:      Effort: Pulmonary effort is normal. No respiratory distress.      Breath sounds: Decreased breath sounds present. No wheezing or rales.   Chest:      Chest wall: No tenderness.   Abdominal:      General: Bowel sounds are normal. There is no distension.      Palpations: Abdomen is soft.      Tenderness: There is no abdominal tenderness.   Musculoskeletal:      Cervical back: Normal range of motion and neck supple.    Psychiatric:         Mood and Affect: Mood is anxious.         Behavior: Behavior normal.         Results Review:  I have reviewed the labs, radiology results, and diagnostic studies.    Laboratory Data:   Results from last 7 days   Lab Units 09/14/21  0513 09/13/21  1341 09/12/21 2013 09/12/21  1227 09/12/21  1227   SODIUM mmol/L 137  --  137  --  135*   POTASSIUM mmol/L 5.2  --  5.1  --  4.7   CHLORIDE mmol/L 99  --  98  --  97*   CO2 mmol/L 28.0  --  25.0  --  27.0   BUN mg/dL 26*  --  20  --  22*   CREATININE mg/dL 1.07 1.00 1.05  1.05   < > 1.14   GLUCOSE mg/dL 131*  --  118*  --  95   CALCIUM mg/dL 9.6  --  8.8  --  9.1   BILIRUBIN mg/dL 0.3 0.2 0.3  0.3   < > 0.3   ALK PHOS U/L 59 57 57  57   < > 57   ALT (SGPT) U/L 60* 50* 50*  50*   < > 40   AST (SGOT) U/L 42* 39 63*  63*   < > 45*   ANION GAP mmol/L 10.0  --  14.0  --  11.0    < > = values in this interval not displayed.     Estimated Creatinine Clearance: 103.8 mL/min (by C-G formula based on SCr of 1.07 mg/dL).          Results from last 7 days   Lab Units 09/14/21  0513 09/12/21 2019 09/12/21  1227   WBC 10*3/mm3 14.75* 5.97 6.51   HEMOGLOBIN g/dL 13.9 13.9 14.0   HEMATOCRIT % 41.6 41.9 42.9   PLATELETS 10*3/mm3 356 266 228           Culture Data:   No results found for: BLOODCX  No results found for: URINECX  No results found for: RESPCX  No results found for: WOUNDCX  No results found for: STOOLCX  No components found for: BODYFLD    Radiology Data:   Imaging Results (Last 24 Hours)     ** No results found for the last 24 hours. **          I have reviewed the patient's current medications.     Assessment/Plan     Active Hospital Problems    Diagnosis    • Pneumonia due to COVID-19 virus        Plan:  1.  Acute hypoxic respiratory failure: Improving.  Secondary to COVID-19 pneumonia.  Currently requiring 5 L per high flow nasal cannula.  Oxygen saturation acceptable.  CTA shows no PE.  2.  COVID-19 pneumonia: Continue dexamethasone,  remdesivir, and bronchodilators.  Follow inflammatory labs.  Currently trending down.  3.  DVT prophylaxis: Lovenox.    The patient was evaluated during the global COVID-19 pandemic, and the diagnosis was suspected/considered upon their initial presentation.  Evaluation, treatment, and testing were consistent with current guidelines for patients who present with complaints or symptoms that may be related to COVID-19.    I confirmed that the patient's Advance Care Plan is present, code status is documented, or surrogate decision maker is listed in the patient's medical record.       Discharge Planning: I expect patient to be discharged to home in 4-5 days.        This document has been electronically signed by Cuco Darden MD on September 14, 2021 18:23 CDT

## 2021-09-15 LAB
ALBUMIN SERPL-MCNC: 3.5 G/DL (ref 3.5–5.2)
ALBUMIN/GLOB SERPL: 1.2 G/DL
ALP SERPL-CCNC: 68 U/L (ref 39–117)
ALT SERPL W P-5'-P-CCNC: 188 U/L (ref 1–41)
ANION GAP SERPL CALCULATED.3IONS-SCNC: 8 MMOL/L (ref 5–15)
AST SERPL-CCNC: 89 U/L (ref 1–40)
BASOPHILS # BLD AUTO: 0.04 10*3/MM3 (ref 0–0.2)
BASOPHILS NFR BLD AUTO: 0.2 % (ref 0–1.5)
BILIRUB CONJ SERPL-MCNC: <0.2 MG/DL (ref 0–0.3)
BILIRUB SERPL-MCNC: 0.3 MG/DL (ref 0–1.2)
BUN SERPL-MCNC: 30 MG/DL (ref 6–20)
BUN/CREAT SERPL: 31.6 (ref 7–25)
CALCIUM SPEC-SCNC: 9.2 MG/DL (ref 8.6–10.5)
CHLORIDE SERPL-SCNC: 99 MMOL/L (ref 98–107)
CO2 SERPL-SCNC: 29 MMOL/L (ref 22–29)
CREAT SERPL-MCNC: 0.95 MG/DL (ref 0.76–1.27)
CRP SERPL-MCNC: 2.71 MG/DL (ref 0–0.5)
D-DIMER, QUANTITATIVE (MAD,POW, STR): 504 NG/ML (FEU) (ref 0–470)
DEPRECATED RDW RBC AUTO: 42.1 FL (ref 37–54)
EOSINOPHIL # BLD AUTO: 0.01 10*3/MM3 (ref 0–0.4)
EOSINOPHIL NFR BLD AUTO: 0.1 % (ref 0.3–6.2)
ERYTHROCYTE [DISTWIDTH] IN BLOOD BY AUTOMATED COUNT: 13.4 % (ref 12.3–15.4)
GFR SERPL CREATININE-BSD FRML MDRD: 90 ML/MIN/1.73
GLOBULIN UR ELPH-MCNC: 3 GM/DL
GLUCOSE SERPL-MCNC: 99 MG/DL (ref 65–99)
HCT VFR BLD AUTO: 41.3 % (ref 37.5–51)
HGB BLD-MCNC: 13.7 G/DL (ref 13–17.7)
IMM GRANULOCYTES # BLD AUTO: 0.49 10*3/MM3 (ref 0–0.05)
IMM GRANULOCYTES NFR BLD AUTO: 3 % (ref 0–0.5)
LDH SERPL-CCNC: 451 U/L (ref 135–225)
LYMPHOCYTES # BLD AUTO: 0.86 10*3/MM3 (ref 0.7–3.1)
LYMPHOCYTES NFR BLD AUTO: 5.3 % (ref 19.6–45.3)
MCH RBC QN AUTO: 28.5 PG (ref 26.6–33)
MCHC RBC AUTO-ENTMCNC: 33.2 G/DL (ref 31.5–35.7)
MCV RBC AUTO: 86 FL (ref 79–97)
MONOCYTES # BLD AUTO: 0.8 10*3/MM3 (ref 0.1–0.9)
MONOCYTES NFR BLD AUTO: 5 % (ref 5–12)
NEUTROPHILS NFR BLD AUTO: 13.89 10*3/MM3 (ref 1.7–7)
NEUTROPHILS NFR BLD AUTO: 86.4 % (ref 42.7–76)
NRBC BLD AUTO-RTO: 0 /100 WBC (ref 0–0.2)
PLATELET # BLD AUTO: 366 10*3/MM3 (ref 140–450)
PMV BLD AUTO: 9.6 FL (ref 6–12)
POTASSIUM SERPL-SCNC: 4.7 MMOL/L (ref 3.5–5.2)
PROT SERPL-MCNC: 6.5 G/DL (ref 6–8.5)
RBC # BLD AUTO: 4.8 10*6/MM3 (ref 4.14–5.8)
SODIUM SERPL-SCNC: 136 MMOL/L (ref 136–145)
WBC # BLD AUTO: 16.09 10*3/MM3 (ref 3.4–10.8)

## 2021-09-15 PROCEDURE — 25010000002 ENOXAPARIN PER 10 MG: Performed by: INTERNAL MEDICINE

## 2021-09-15 PROCEDURE — 85379 FIBRIN DEGRADATION QUANT: CPT | Performed by: INTERNAL MEDICINE

## 2021-09-15 PROCEDURE — 94799 UNLISTED PULMONARY SVC/PX: CPT

## 2021-09-15 PROCEDURE — 36415 COLL VENOUS BLD VENIPUNCTURE: CPT | Performed by: INTERNAL MEDICINE

## 2021-09-15 PROCEDURE — 63710000001 DEXAMETHASONE PER 0.25 MG: Performed by: INTERNAL MEDICINE

## 2021-09-15 PROCEDURE — 83615 LACTATE (LD) (LDH) ENZYME: CPT | Performed by: INTERNAL MEDICINE

## 2021-09-15 PROCEDURE — 86140 C-REACTIVE PROTEIN: CPT | Performed by: INTERNAL MEDICINE

## 2021-09-15 PROCEDURE — 85025 COMPLETE CBC W/AUTO DIFF WBC: CPT | Performed by: INTERNAL MEDICINE

## 2021-09-15 PROCEDURE — 80053 COMPREHEN METABOLIC PANEL: CPT | Performed by: INTERNAL MEDICINE

## 2021-09-15 PROCEDURE — 94760 N-INVAS EAR/PLS OXIMETRY 1: CPT

## 2021-09-15 PROCEDURE — 82248 BILIRUBIN DIRECT: CPT | Performed by: INTERNAL MEDICINE

## 2021-09-15 RX ADMIN — ALBUTEROL SULFATE 2 PUFF: 90 AEROSOL, METERED RESPIRATORY (INHALATION) at 20:03

## 2021-09-15 RX ADMIN — ALBUTEROL SULFATE 2 PUFF: 90 AEROSOL, METERED RESPIRATORY (INHALATION) at 16:22

## 2021-09-15 RX ADMIN — SODIUM CHLORIDE, PRESERVATIVE FREE 10 ML: 5 INJECTION INTRAVENOUS at 08:13

## 2021-09-15 RX ADMIN — DEXAMETHASONE 6 MG: 2 TABLET ORAL at 08:12

## 2021-09-15 RX ADMIN — ALBUTEROL SULFATE 2 PUFF: 90 AEROSOL, METERED RESPIRATORY (INHALATION) at 13:10

## 2021-09-15 RX ADMIN — ENOXAPARIN SODIUM 40 MG: 40 INJECTION SUBCUTANEOUS at 01:47

## 2021-09-15 RX ADMIN — ALBUTEROL SULFATE 2 PUFF: 90 AEROSOL, METERED RESPIRATORY (INHALATION) at 08:39

## 2021-09-15 NOTE — PROGRESS NOTES
[facility] Medicine Services  INPATIENT PROGRESS NOTE    Length of Stay: 3  Date of Admission: 9/12/2021  Primary Care Physician: Provider, No Known    Subjective   Chief Complaint:  Shortness of breath   HPI: Patient states that he maybe feels some better.  Still short of breath with any exertion.    Review of Systems   Constitutional: Positive for activity change, appetite change and fatigue. Negative for chills and fever.   Respiratory: Positive for cough and shortness of breath. Negative for chest tightness.    Cardiovascular: Negative for chest pain, palpitations and leg swelling.   Gastrointestinal: Negative for abdominal pain, constipation, diarrhea, nausea and vomiting.   Skin: Negative for wound.   Neurological: Negative for dizziness, weakness, light-headedness, numbness and headaches.        All pertinent negatives and positives are as above. All other systems have been reviewed and are negative unless otherwise stated.     Objective    Temp:  [96.3 °F (35.7 °C)-97.6 °F (36.4 °C)] 97.6 °F (36.4 °C)  Heart Rate:  [47-70] 62  Resp:  [18-28] 20  BP: (112-130)/(64-79) 112/68    Physical Exam  Vitals reviewed.   Constitutional:       Appearance: He is well-developed.   HENT:      Head: Normocephalic and atraumatic.   Eyes:      Pupils: Pupils are equal, round, and reactive to light.   Cardiovascular:      Rate and Rhythm: Normal rate and regular rhythm.      Heart sounds: Normal heart sounds. No murmur heard.   No friction rub. No gallop.    Pulmonary:      Effort: Pulmonary effort is normal. No respiratory distress.      Breath sounds: Decreased breath sounds present. No wheezing or rales.   Chest:      Chest wall: No tenderness.   Abdominal:      General: Bowel sounds are normal. There is no distension.      Palpations: Abdomen is soft.      Tenderness: There is no abdominal tenderness.   Musculoskeletal:      Cervical back: Normal range of motion and neck supple.    Psychiatric:         Mood and Affect: Mood is anxious.         Behavior: Behavior normal.         Results Review:  I have reviewed the labs, radiology results, and diagnostic studies.    Laboratory Data:   Results from last 7 days   Lab Units 09/15/21  0551 09/14/21  0513 09/13/21  1341 09/12/21 2013 09/12/21 2013   SODIUM mmol/L 136 137  --   --  137   POTASSIUM mmol/L 4.7 5.2  --   --  5.1   CHLORIDE mmol/L 99 99  --   --  98   CO2 mmol/L 29.0 28.0  --   --  25.0   BUN mg/dL 30* 26*  --   --  20   CREATININE mg/dL 0.95 1.07 1.00   < > 1.05  1.05   GLUCOSE mg/dL 99 131*  --   --  118*   CALCIUM mg/dL 9.2 9.6  --   --  8.8   BILIRUBIN mg/dL 0.3 0.3 0.2   < > 0.3  0.3   ALK PHOS U/L 68 59 57   < > 57  57   ALT (SGPT) U/L 188* 60* 50*   < > 50*  50*   AST (SGOT) U/L 89* 42* 39   < > 63*  63*   ANION GAP mmol/L 8.0 10.0  --   --  14.0    < > = values in this interval not displayed.     Estimated Creatinine Clearance: 116.5 mL/min (by C-G formula based on SCr of 0.95 mg/dL).          Results from last 7 days   Lab Units 09/15/21  0551 09/14/21  0513 09/12/21 2019 09/12/21  1227   WBC 10*3/mm3 16.09* 14.75* 5.97 6.51   HEMOGLOBIN g/dL 13.7 13.9 13.9 14.0   HEMATOCRIT % 41.3 41.6 41.9 42.9   PLATELETS 10*3/mm3 366 356 266 228           Culture Data:   No results found for: BLOODCX  No results found for: URINECX  No results found for: RESPCX  No results found for: WOUNDCX  No results found for: STOOLCX  No components found for: BODYFLD    Radiology Data:   Imaging Results (Last 24 Hours)     ** No results found for the last 24 hours. **          I have reviewed the patient's current medications.     Assessment/Plan     Active Hospital Problems    Diagnosis    • Pneumonia due to COVID-19 virus        Plan:  1.  Acute hypoxic respiratory failure: Improving.  Secondary to COVID-19 pneumonia.  Currently requiring 6 L per high flow nasal cannula.  Oxygen saturation acceptable.  CTA shows no PE.  2.  COVID-19  pneumonia: Continue dexamethasone, remdesivir, and bronchodilators.  Follow inflammatory labs.  Currently trending down.  3.  DVT prophylaxis: Lovenox.    The patient was evaluated during the global COVID-19 pandemic, and the diagnosis was suspected/considered upon their initial presentation.  Evaluation, treatment, and testing were consistent with current guidelines for patients who present with complaints or symptoms that may be related to COVID-19.    I confirmed that the patient's Advance Care Plan is present, code status is documented, or surrogate decision maker is listed in the patient's medical record.       Discharge Planning: I expect patient to be discharged to home in 4-5 days.        This document has been electronically signed by Cuco Darden MD on September 15, 2021 17:26 CDT

## 2021-09-15 NOTE — PLAN OF CARE
Goal Outcome Evaluation:  Plan of Care Reviewed With: patient        Progress: improving  Outcome Summary: O2 decreased to 6L HF NC. VSS. Educated on IS. Educated on proning.

## 2021-09-15 NOTE — PLAN OF CARE
Goal Outcome Evaluation:           Progress: improving  Outcome Summary: VSS. Pt has an increase in appetite. No c/o SOA. Will continue to monitor.

## 2021-09-16 LAB
ALBUMIN SERPL-MCNC: 3.4 G/DL (ref 3.5–5.2)
ALBUMIN/GLOB SERPL: 1.1 G/DL
ALP SERPL-CCNC: 68 U/L (ref 39–117)
ALT SERPL W P-5'-P-CCNC: 123 U/L (ref 1–41)
ANION GAP SERPL CALCULATED.3IONS-SCNC: 8 MMOL/L (ref 5–15)
ANISOCYTOSIS BLD QL: ABNORMAL
AST SERPL-CCNC: 29 U/L (ref 1–40)
BILIRUB CONJ SERPL-MCNC: <0.2 MG/DL (ref 0–0.3)
BILIRUB SERPL-MCNC: 0.3 MG/DL (ref 0–1.2)
BUN SERPL-MCNC: 29 MG/DL (ref 6–20)
BUN/CREAT SERPL: 27.6 (ref 7–25)
CALCIUM SPEC-SCNC: 9.4 MG/DL (ref 8.6–10.5)
CHLORIDE SERPL-SCNC: 102 MMOL/L (ref 98–107)
CO2 SERPL-SCNC: 29 MMOL/L (ref 22–29)
CREAT SERPL-MCNC: 1.05 MG/DL (ref 0.76–1.27)
CRP SERPL-MCNC: 1.88 MG/DL (ref 0–0.5)
DEPRECATED RDW RBC AUTO: 42.1 FL (ref 37–54)
EOSINOPHIL # BLD MANUAL: 0.15 10*3/MM3 (ref 0–0.4)
EOSINOPHIL NFR BLD MANUAL: 1 % (ref 0.3–6.2)
ERYTHROCYTE [DISTWIDTH] IN BLOOD BY AUTOMATED COUNT: 13.4 % (ref 12.3–15.4)
GFR SERPL CREATININE-BSD FRML MDRD: 80 ML/MIN/1.73
GLOBULIN UR ELPH-MCNC: 3.2 GM/DL
GLUCOSE SERPL-MCNC: 99 MG/DL (ref 65–99)
HCT VFR BLD AUTO: 42.2 % (ref 37.5–51)
HGB BLD-MCNC: 13.7 G/DL (ref 13–17.7)
LYMPHOCYTES # BLD MANUAL: 1.24 10*3/MM3 (ref 0.7–3.1)
LYMPHOCYTES NFR BLD MANUAL: 4 % (ref 5–12)
LYMPHOCYTES NFR BLD MANUAL: 8 % (ref 19.6–45.3)
MCH RBC QN AUTO: 28.1 PG (ref 26.6–33)
MCHC RBC AUTO-ENTMCNC: 32.5 G/DL (ref 31.5–35.7)
MCV RBC AUTO: 86.5 FL (ref 79–97)
MONOCYTES # BLD AUTO: 0.62 10*3/MM3 (ref 0.1–0.9)
NEUTROPHILS # BLD AUTO: 13.46 10*3/MM3 (ref 1.7–7)
NEUTROPHILS NFR BLD MANUAL: 82 % (ref 42.7–76)
NEUTS BAND NFR BLD MANUAL: 5 % (ref 0–5)
PLATELET # BLD AUTO: 384 10*3/MM3 (ref 140–450)
PMV BLD AUTO: 9.6 FL (ref 6–12)
POTASSIUM SERPL-SCNC: 4.8 MMOL/L (ref 3.5–5.2)
PROT SERPL-MCNC: 6.6 G/DL (ref 6–8.5)
RBC # BLD AUTO: 4.88 10*6/MM3 (ref 4.14–5.8)
SMALL PLATELETS BLD QL SMEAR: ADEQUATE
SODIUM SERPL-SCNC: 139 MMOL/L (ref 136–145)
WBC # BLD AUTO: 15.47 10*3/MM3 (ref 3.4–10.8)
WBC MORPH BLD: NORMAL

## 2021-09-16 PROCEDURE — 25010000002 ENOXAPARIN PER 10 MG: Performed by: INTERNAL MEDICINE

## 2021-09-16 PROCEDURE — 80053 COMPREHEN METABOLIC PANEL: CPT | Performed by: INTERNAL MEDICINE

## 2021-09-16 PROCEDURE — 85007 BL SMEAR W/DIFF WBC COUNT: CPT | Performed by: INTERNAL MEDICINE

## 2021-09-16 PROCEDURE — 82248 BILIRUBIN DIRECT: CPT | Performed by: INTERNAL MEDICINE

## 2021-09-16 PROCEDURE — 94799 UNLISTED PULMONARY SVC/PX: CPT

## 2021-09-16 PROCEDURE — 25010000002 DEXAMETHASONE PER 1 MG: Performed by: INTERNAL MEDICINE

## 2021-09-16 PROCEDURE — 85025 COMPLETE CBC W/AUTO DIFF WBC: CPT | Performed by: INTERNAL MEDICINE

## 2021-09-16 PROCEDURE — 86140 C-REACTIVE PROTEIN: CPT | Performed by: INTERNAL MEDICINE

## 2021-09-16 PROCEDURE — 94760 N-INVAS EAR/PLS OXIMETRY 1: CPT

## 2021-09-16 RX ADMIN — ENOXAPARIN SODIUM 40 MG: 40 INJECTION SUBCUTANEOUS at 01:31

## 2021-09-16 RX ADMIN — ALBUTEROL SULFATE 2 PUFF: 90 AEROSOL, METERED RESPIRATORY (INHALATION) at 10:30

## 2021-09-16 RX ADMIN — ALBUTEROL SULFATE 2 PUFF: 90 AEROSOL, METERED RESPIRATORY (INHALATION) at 21:58

## 2021-09-16 RX ADMIN — ALBUTEROL SULFATE 2 PUFF: 90 AEROSOL, METERED RESPIRATORY (INHALATION) at 06:49

## 2021-09-16 RX ADMIN — ALBUTEROL SULFATE 2 PUFF: 90 AEROSOL, METERED RESPIRATORY (INHALATION) at 15:38

## 2021-09-16 RX ADMIN — DEXAMETHASONE SODIUM PHOSPHATE 6 MG: 4 INJECTION, SOLUTION INTRAMUSCULAR; INTRAVENOUS at 08:09

## 2021-09-16 NOTE — PROGRESS NOTES
[facility] Medicine Services  INPATIENT PROGRESS NOTE    Length of Stay: 4  Date of Admission: 9/12/2021  Primary Care Physician: Provider, No Known    Subjective   Chief Complaint:  Shortness of breath   HPI: Patient states that he is feeling better today.  They will see most of the day in prone position.    Review of Systems   Constitutional: Positive for activity change, appetite change and fatigue. Negative for chills and fever.   Respiratory: Positive for cough and shortness of breath. Negative for chest tightness.    Cardiovascular: Negative for chest pain, palpitations and leg swelling.   Gastrointestinal: Negative for abdominal pain, constipation, diarrhea, nausea and vomiting.   Skin: Negative for wound.   Neurological: Negative for dizziness, weakness, light-headedness, numbness and headaches.        All pertinent negatives and positives are as above. All other systems have been reviewed and are negative unless otherwise stated.     Objective    Temp:  [97.4 °F (36.3 °C)-98.6 °F (37 °C)] 98.5 °F (36.9 °C)  Heart Rate:  [50-89] 78  Resp:  [18-24] 18  BP: (100-114)/(59-74) 114/59    Physical Exam  Vitals reviewed.   Constitutional:       Appearance: He is well-developed.   HENT:      Head: Normocephalic and atraumatic.   Eyes:      Pupils: Pupils are equal, round, and reactive to light.   Cardiovascular:      Rate and Rhythm: Normal rate and regular rhythm.      Heart sounds: Normal heart sounds. No murmur heard.   No friction rub. No gallop.    Pulmonary:      Effort: Pulmonary effort is normal. No respiratory distress.      Breath sounds: Decreased breath sounds present. No wheezing or rales.   Chest:      Chest wall: No tenderness.   Abdominal:      General: Bowel sounds are normal. There is no distension.      Palpations: Abdomen is soft.      Tenderness: There is no abdominal tenderness.   Musculoskeletal:      Cervical back: Normal range of motion and neck  supple.   Psychiatric:         Mood and Affect: Mood is anxious.         Behavior: Behavior normal.         Results Review:  I have reviewed the labs, radiology results, and diagnostic studies.    Laboratory Data:   Results from last 7 days   Lab Units 09/16/21  0553 09/15/21  0551 09/14/21  0513   SODIUM mmol/L 139 136 137   POTASSIUM mmol/L 4.8 4.7 5.2   CHLORIDE mmol/L 102 99 99   CO2 mmol/L 29.0 29.0 28.0   BUN mg/dL 29* 30* 26*   CREATININE mg/dL 1.05 0.95 1.07   GLUCOSE mg/dL 99 99 131*   CALCIUM mg/dL 9.4 9.2 9.6   BILIRUBIN mg/dL 0.3 0.3 0.3   ALK PHOS U/L 68 68 59   ALT (SGPT) U/L 123* 188* 60*   AST (SGOT) U/L 29 89* 42*   ANION GAP mmol/L 8.0 8.0 10.0     Estimated Creatinine Clearance: 116.2 mL/min (by C-G formula based on SCr of 1.05 mg/dL).          Results from last 7 days   Lab Units 09/16/21  0553 09/15/21  0551 09/14/21  0513 09/12/21 2019 09/12/21  1227   WBC 10*3/mm3 15.47* 16.09* 14.75* 5.97 6.51   HEMOGLOBIN g/dL 13.7 13.7 13.9 13.9 14.0   HEMATOCRIT % 42.2 41.3 41.6 41.9 42.9   PLATELETS 10*3/mm3 384 366 356 266 228           Culture Data:   No results found for: BLOODCX  No results found for: URINECX  No results found for: RESPCX  No results found for: WOUNDCX  No results found for: STOOLCX  No components found for: BODYFLD    Radiology Data:   Imaging Results (Last 24 Hours)     ** No results found for the last 24 hours. **          I have reviewed the patient's current medications.     Assessment/Plan     Active Hospital Problems    Diagnosis    • Pneumonia due to COVID-19 virus        Plan:  1.  Acute hypoxic respiratory failure: Improving.  Secondary to COVID-19 pneumonia.  Currently requiring 4.5 L per high flow nasal cannula.  Oxygen saturation acceptable.  CTA shows no PE.  2.  COVID-19 pneumonia: Continue dexamethasone, remdesivir, and bronchodilators.  Follow inflammatory labs.  Currently trending down.  3.  DVT prophylaxis: Lovenox.    The patient was evaluated during the global  COVID-19 pandemic, and the diagnosis was suspected/considered upon their initial presentation.  Evaluation, treatment, and testing were consistent with current guidelines for patients who present with complaints or symptoms that may be related to COVID-19.    I confirmed that the patient's Advance Care Plan is present, code status is documented, or surrogate decision maker is listed in the patient's medical record.       Discharge Planning: I expect patient to be discharged to home in 4-5 days.        This document has been electronically signed by Cuco Darden MD on September 16, 2021 17:01 CDT

## 2021-09-16 NOTE — PLAN OF CARE
Goal Outcome Evaluation:           Progress: improving  Outcome Summary: pt rested well thoughout the night. no current complaints. Will continue to monitor.

## 2021-09-16 NOTE — PAYOR COMM NOTE
"    Suyapa Soto RN [facility]  [phone]      Phone  [phone]       Fax  Cont stay review      Sasha De La Vega (36 y.o. Male)     Date of Birth Social Security Number Address Home Phone MRN    1985  [address]  [facility] [address] [phone] [id number]    Muslim Marital Status          Faith        Admission Date Admission Type Admitting Provider Attending Provider Department, Room/Bed    9/12/21 Emergency Joaquim Rae Jr., MD Knizley, Robert C Jr., MD [facility] 4 WEST, 422/1    Discharge Date Discharge Disposition Discharge Destination                       Attending Provider: Joaquim Rae Jr., MD    Allergies: Chocolate    Isolation: Contact Drop, Enh Drop/Con   Infection: COVID (confirmed) (09/12/21)   Code Status: CPR    Ht: 175.3 cm (69\")   Wt: 84.5 kg (186 lb 4.8 oz)    Admission Cmt: None   Principal Problem: None                Active Insurance as of 9/12/2021     Primary Coverage     Payor Plan Insurance Group Employer/Plan Group    [employer] [place] [employer] [place]      Payor Plan Address Payor Plan Phone Number Payor Plan Fax Number Effective Dates    [address] [address] [phone]  9/12/2021 - None Entered    [facility] [id number]       Subscriber Name Subscriber Birth Date Member ID       SASHA DE LA VEGA 1985 [id number]                 Emergency Contacts      (Rel.) Home Phone Work Phone Mobile Phone    MARTÍN BELCHER (Significant Other) [phone] -- [phone]            Vital Signs (last day)     Date/Time   Temp   Temp src   Pulse   Resp   BP   Patient Position   SpO2    09/16/21 0835   --   --   80   --   --   --   --    09/16/21 0808   --   --   --   --   --   --   92    09/16/21 0803   98.4 (36.9)   Oral   78   18   112/68   Lying   (!) 86    09/16/21 0649   --   --   89   18   --   --   91    09/16/21 0404   97.4 (36.3)   Oral   88   18   " 112/74   Lying   93    09/16/21 0144   --   --   50   --   --   --   --    09/15/21 2355   97.8 (36.6)   Oral   82   18   105/66   Lying   91    09/15/21 2003   --   --   77   18   --   --   93    09/15/21 1940   97.7 (36.5)   Oral   86   20   112/63   Lying   91    09/15/21 1749   --   --   62   --   --   --   --    09/15/21 1737   --   --   --   --   --   --   96    09/15/21 1622   --   --   62   20   --   Lying   97    09/15/21 1550   97.6 (36.4)   Oral   63   28   112/68   --   92    09/15/21 1325   --   --   60   --   --   --   --    09/15/21 1314   --   --   --   --   --   --   99    09/15/21 1310   --   --   62   20   --   Sitting   97    09/15/21 1050   96.3 (35.7)   Oral   59   20   119/74   Lying   94    09/15/21 1036   --   --   --   --   --   --   95    09/15/21 0839   --   --   64   20   --   --   94    09/15/21 0809   97 (36.1)   Oral   66   18   113/64   Lying   92    09/15/21 0337   96.6 (35.9)   Oral   54   20   120/75   Lying   93    09/15/21 0153   --   --   (!) 47   --   --   --   --              Oxygen Therapy (last day)     Date/Time   SpO2   Device (Oxygen Therapy)   Flow (L/min)   Oxygen Concentration (%)   ETCO2 (mmHg)    09/16/21 0808   92   high-flow nasal cannula   6   --   --    09/16/21 0803   (!) 86   nasal cannula   4.5   --   --    09/16/21 0649   91   nasal cannula   5   --   --    09/16/21 0404   93   humidified;nasal cannula   4.5   --   --    09/15/21 2355   91   humidified;nasal cannula   4.5   --   --    09/15/21 2003   93   humidified;nasal cannula   4.5   --   --    09/15/21 1940   91   humidified;nasal cannula   4.5   --   --    09/15/21 1737   96   nasal cannula;humidified   (S) 4.5   --   --    09/15/21 1622   97   (S) humidified;high-flow nasal cannula   (S) 6   --   --    09/15/21 1550   92   --   --   --   --    09/15/21 1314   99   high-flow nasal cannula;humidified   (S) 6   --   --    09/15/21 1310   97   humidified;high-flow nasal cannula   6   --   --    09/15/21  1050   94   --   8   --   --    09/15/21 1036   95   high-flow nasal cannula;humidified   (S) 8   --   --    09/15/21 0839   94   humidified;high-flow nasal cannula   10   --   --    09/15/21 0809   92   high-flow nasal cannula;humidified   10   --   --    09/15/21 0337   93   high-flow nasal cannula   --   --   --              Current Facility-Administered Medications   Medication Dose Route Frequency Provider Last Rate Last Admin   • acetaminophen (TYLENOL) tablet 650 mg  650 mg Oral Q4H PRN Joaquim Rae Jr., MD        Or   • acetaminophen (TYLENOL) suppository 650 mg  650 mg Rectal Q4H PRN Joaquim Rae Jr., MD       • albuterol sulfate HFA (PROVENTIL HFA;VENTOLIN HFA;PROAIR HFA) inhaler 2 puff  2 puff Inhalation 4x Daily - RT Oleg Gupta MD   2 puff at 09/16/21 0649   • benzonatate (TESSALON) capsule 100 mg  100 mg Oral TID PRN Joaquim Rae Jr., MD       • dexamethasone (DECADRON) tablet 6 mg  6 mg Oral Daily Joaquim Rae Jr., MD   6 mg at 09/15/21 0812    Or   • dexamethasone (DECADRON) injection 6 mg  6 mg Intravenous Daily Joaquim Rae Jr., MD   6 mg at 09/16/21 0809   • enoxaparin (LOVENOX) syringe 40 mg  40 mg Subcutaneous Q24H Joaquim Rae Jr., MD   40 mg at 09/16/21 0131   • sodium chloride 0.9 % flush 10 mL  10 mL Intravenous PRN Oleg Gupta MD   10 mL at 09/15/21 0813        Physician Progress Notes (last 48 hours) (Notes from 09/14/21 0926 through 09/16/21 0926)      Cuco Darden MD at 09/15/21 1726              [facility] Medicine Services  INPATIENT PROGRESS NOTE    Length of Stay: 3  Date of Admission: 9/12/2021  Primary Care Physician: Provider, No Known    Subjective   Chief Complaint:  Shortness of breath   HPI: Patient states that he maybe feels some better.  Still short of breath with any exertion.    Review of Systems   Constitutional: Positive for activity change, appetite change and fatigue. Negative for chills  and fever.   Respiratory: Positive for cough and shortness of breath. Negative for chest tightness.    Cardiovascular: Negative for chest pain, palpitations and leg swelling.   Gastrointestinal: Negative for abdominal pain, constipation, diarrhea, nausea and vomiting.   Skin: Negative for wound.   Neurological: Negative for dizziness, weakness, light-headedness, numbness and headaches.        All pertinent negatives and positives are as above. All other systems have been reviewed and are negative unless otherwise stated.     Objective    Temp:  [96.3 °F (35.7 °C)-97.6 °F (36.4 °C)] 97.6 °F (36.4 °C)  Heart Rate:  [47-70] 62  Resp:  [18-28] 20  BP: (112-130)/(64-79) 112/68    Physical Exam  Vitals reviewed.   Constitutional:       Appearance: He is well-developed.   HENT:      Head: Normocephalic and atraumatic.   Eyes:      Pupils: Pupils are equal, round, and reactive to light.   Cardiovascular:      Rate and Rhythm: Normal rate and regular rhythm.      Heart sounds: Normal heart sounds. No murmur heard.   No friction rub. No gallop.    Pulmonary:      Effort: Pulmonary effort is normal. No respiratory distress.      Breath sounds: Decreased breath sounds present. No wheezing or rales.   Chest:      Chest wall: No tenderness.   Abdominal:      General: Bowel sounds are normal. There is no distension.      Palpations: Abdomen is soft.      Tenderness: There is no abdominal tenderness.   Musculoskeletal:      Cervical back: Normal range of motion and neck supple.   Psychiatric:         Mood and Affect: Mood is anxious.         Behavior: Behavior normal.         Results Review:  I have reviewed the labs, radiology results, and diagnostic studies.    Laboratory Data:   Results from last 7 days   Lab Units 09/15/21  0551 09/14/21  0513 09/13/21  1341 09/12/21 2013 09/12/21 2013   SODIUM mmol/L 136 137  --   --  137   POTASSIUM mmol/L 4.7 5.2  --   --  5.1   CHLORIDE mmol/L 99 99  --   --  98   CO2 mmol/L 29.0 28.0  --    --  25.0   BUN mg/dL 30* 26*  --   --  20   CREATININE mg/dL 0.95 1.07 1.00   < > 1.05  1.05   GLUCOSE mg/dL 99 131*  --   --  118*   CALCIUM mg/dL 9.2 9.6  --   --  8.8   BILIRUBIN mg/dL 0.3 0.3 0.2   < > 0.3  0.3   ALK PHOS U/L 68 59 57   < > 57  57   ALT (SGPT) U/L 188* 60* 50*   < > 50*  50*   AST (SGOT) U/L 89* 42* 39   < > 63*  63*   ANION GAP mmol/L 8.0 10.0  --   --  14.0    < > = values in this interval not displayed.     Estimated Creatinine Clearance: 116.5 mL/min (by C-G formula based on SCr of 0.95 mg/dL).          Results from last 7 days   Lab Units 09/15/21  0551 09/14/21  0513 09/12/21 2019 09/12/21  1227   WBC 10*3/mm3 16.09* 14.75* 5.97 6.51   HEMOGLOBIN g/dL 13.7 13.9 13.9 14.0   HEMATOCRIT % 41.3 41.6 41.9 42.9   PLATELETS 10*3/mm3 366 356 266 228           Culture Data:   No results found for: BLOODCX  No results found for: URINECX  No results found for: RESPCX  No results found for: WOUNDCX  No results found for: STOOLCX  No components found for: BODYFLD    Radiology Data:   Imaging Results (Last 24 Hours)     ** No results found for the last 24 hours. **          I have reviewed the patient's current medications.     Assessment/Plan     Active Hospital Problems    Diagnosis    • Pneumonia due to COVID-19 virus        Plan:  1.  Acute hypoxic respiratory failure: Improving.  Secondary to COVID-19 pneumonia.  Currently requiring 6 L per high flow nasal cannula.  Oxygen saturation acceptable.  CTA shows no PE.  2.  COVID-19 pneumonia: Continue dexamethasone, remdesivir, and bronchodilators.  Follow inflammatory labs.  Currently trending down.  3.  DVT prophylaxis: Lovenox.    The patient was evaluated during the global COVID-19 pandemic, and the diagnosis was suspected/considered upon their initial presentation.  Evaluation, treatment, and testing were consistent with current guidelines for patients who present with complaints or symptoms that may be related to COVID-19.    I confirmed  that the patient's Advance Care Plan is present, code status is documented, or surrogate decision maker is listed in the patient's medical record.       Discharge Planning: I expect patient to be discharged to home in 4-5 days.        This document has been electronically signed by Cuco Darden MD on September 15, 2021 17:26 CDT        Electronically signed by Cuco Darden MD at 09/15/21 1728     Cuco Darden MD at 09/14/21 1823              [facility] Medicine Services  INPATIENT PROGRESS NOTE    Length of Stay: 2  Date of Admission: 9/12/2021  Primary Care Physician: Provider, No Known    Subjective   Chief Complaint:  Shortness of breath   HPI: Patient states that he did not sleep well overnight, but is breathing some better today.    Review of Systems   Constitutional: Positive for activity change, appetite change and fatigue. Negative for chills and fever.   Respiratory: Positive for cough and shortness of breath. Negative for chest tightness.    Cardiovascular: Negative for chest pain, palpitations and leg swelling.   Gastrointestinal: Negative for abdominal pain, constipation, diarrhea, nausea and vomiting.   Skin: Negative for wound.   Neurological: Negative for dizziness, weakness, light-headedness, numbness and headaches.        All pertinent negatives and positives are as above. All other systems have been reviewed and are negative unless otherwise stated.     Objective    Temp:  [96.7 °F (35.9 °C)-97.7 °F (36.5 °C)] 96.7 °F (35.9 °C)  Heart Rate:  [52-74] 57  Resp:  [18-20] 20  BP: (113-129)/(60-76) 129/60    Physical Exam  Vitals reviewed.   Constitutional:       Appearance: He is well-developed.   HENT:      Head: Normocephalic and atraumatic.   Eyes:      Pupils: Pupils are equal, round, and reactive to light.   Cardiovascular:      Rate and Rhythm: Normal rate and regular rhythm.      Heart sounds: Normal heart sounds. No murmur heard.   No  friction rub. No gallop.    Pulmonary:      Effort: Pulmonary effort is normal. No respiratory distress.      Breath sounds: Decreased breath sounds present. No wheezing or rales.   Chest:      Chest wall: No tenderness.   Abdominal:      General: Bowel sounds are normal. There is no distension.      Palpations: Abdomen is soft.      Tenderness: There is no abdominal tenderness.   Musculoskeletal:      Cervical back: Normal range of motion and neck supple.   Psychiatric:         Mood and Affect: Mood is anxious.         Behavior: Behavior normal.         Results Review:  I have reviewed the labs, radiology results, and diagnostic studies.    Laboratory Data:   Results from last 7 days   Lab Units 09/14/21  0513 09/13/21  1341 09/12/21 2013 09/12/21  1227 09/12/21  1227   SODIUM mmol/L 137  --  137  --  135*   POTASSIUM mmol/L 5.2  --  5.1  --  4.7   CHLORIDE mmol/L 99  --  98  --  97*   CO2 mmol/L 28.0  --  25.0  --  27.0   BUN mg/dL 26*  --  20  --  22*   CREATININE mg/dL 1.07 1.00 1.05  1.05   < > 1.14   GLUCOSE mg/dL 131*  --  118*  --  95   CALCIUM mg/dL 9.6  --  8.8  --  9.1   BILIRUBIN mg/dL 0.3 0.2 0.3  0.3   < > 0.3   ALK PHOS U/L 59 57 57  57   < > 57   ALT (SGPT) U/L 60* 50* 50*  50*   < > 40   AST (SGOT) U/L 42* 39 63*  63*   < > 45*   ANION GAP mmol/L 10.0  --  14.0  --  11.0    < > = values in this interval not displayed.     Estimated Creatinine Clearance: 103.8 mL/min (by C-G formula based on SCr of 1.07 mg/dL).          Results from last 7 days   Lab Units 09/14/21  0513 09/12/21 2019 09/12/21  1227   WBC 10*3/mm3 14.75* 5.97 6.51   HEMOGLOBIN g/dL 13.9 13.9 14.0   HEMATOCRIT % 41.6 41.9 42.9   PLATELETS 10*3/mm3 356 266 228           Culture Data:   No results found for: BLOODCX  No results found for: URINECX  No results found for: RESPCX  No results found for: WOUNDCX  No results found for: STOOLCX  No components found for: BODYFLD    Radiology Data:   Imaging Results (Last 24 Hours)     **  No results found for the last 24 hours. **          I have reviewed the patient's current medications.     Assessment/Plan     Active Hospital Problems    Diagnosis    • Pneumonia due to COVID-19 virus        Plan:  1.  Acute hypoxic respiratory failure: Improving.  Secondary to COVID-19 pneumonia.  Currently requiring 5 L per high flow nasal cannula.  Oxygen saturation acceptable.  CTA shows no PE.  2.  COVID-19 pneumonia: Continue dexamethasone, remdesivir, and bronchodilators.  Follow inflammatory labs.  Currently trending down.  3.  DVT prophylaxis: Lovenox.    The patient was evaluated during the global COVID-19 pandemic, and the diagnosis was suspected/considered upon their initial presentation.  Evaluation, treatment, and testing were consistent with current guidelines for patients who present with complaints or symptoms that may be related to COVID-19.    I confirmed that the patient's Advance Care Plan is present, code status is documented, or surrogate decision maker is listed in the patient's medical record.       Discharge Planning: I expect patient to be discharged to home in 4-5 days.        This document has been electronically signed by Cuco Darden MD on September 14, 2021 18:23 CDT        Electronically signed by Cuco Darden MD at 09/14/21 7284       Medical Student Notes (last 24 hours) (Notes from 09/15/21 0926 through 09/16/21 0926)    No notes of this type exist for this encounter.         Consult Notes (last 24 hours) (Notes from 09/15/21 0926 through 09/16/21 0926)    No notes of this type exist for this encounter.

## 2021-09-16 NOTE — PROGRESS NOTES
Adult Nutrition  Assessment    Patient Name:  Lior Piper  YOB: 1985  MRN: [id number]  Admit Date:  9/12/2021    Assessment Date:  9/16/2021    Comments:  Continues treatment for acute respiratory failure r/t  COVID-19. Continues on decadron, 6L HF. Alb 3.4. Reg diet w/ 2% milk and Boost Plus all meals- 75%x2 meals recorded. Noted ? Wt error on admit, #, BMI 27.5. RD to follow hospital course.       Electronically signed by:  Adrienne Combs RD  09/16/21 14:43 CDT

## 2021-09-17 LAB
ALBUMIN SERPL-MCNC: 3.5 G/DL (ref 3.5–5.2)
ALBUMIN/GLOB SERPL: 1.2 G/DL
ALP SERPL-CCNC: 71 U/L (ref 39–117)
ALT SERPL W P-5'-P-CCNC: 85 U/L (ref 1–41)
ANION GAP SERPL CALCULATED.3IONS-SCNC: 7 MMOL/L (ref 5–15)
AST SERPL-CCNC: 18 U/L (ref 1–40)
BASOPHILS # BLD AUTO: 0.07 10*3/MM3 (ref 0–0.2)
BASOPHILS NFR BLD AUTO: 0.5 % (ref 0–1.5)
BILIRUB CONJ SERPL-MCNC: <0.2 MG/DL (ref 0–0.3)
BILIRUB SERPL-MCNC: 0.3 MG/DL (ref 0–1.2)
BUN SERPL-MCNC: 28 MG/DL (ref 6–20)
BUN/CREAT SERPL: 30.4 (ref 7–25)
CALCIUM SPEC-SCNC: 9 MG/DL (ref 8.6–10.5)
CHLORIDE SERPL-SCNC: 96 MMOL/L (ref 98–107)
CO2 SERPL-SCNC: 30 MMOL/L (ref 22–29)
CREAT SERPL-MCNC: 0.92 MG/DL (ref 0.76–1.27)
CRP SERPL-MCNC: 3.56 MG/DL (ref 0–0.5)
D-DIMER, QUANTITATIVE (MAD,POW, STR): 422 NG/ML (FEU) (ref 0–470)
DEPRECATED RDW RBC AUTO: 42.2 FL (ref 37–54)
EOSINOPHIL # BLD AUTO: 0.26 10*3/MM3 (ref 0–0.4)
EOSINOPHIL NFR BLD AUTO: 1.7 % (ref 0.3–6.2)
ERYTHROCYTE [DISTWIDTH] IN BLOOD BY AUTOMATED COUNT: 13.3 % (ref 12.3–15.4)
GFR SERPL CREATININE-BSD FRML MDRD: 93 ML/MIN/1.73
GLOBULIN UR ELPH-MCNC: 2.9 GM/DL
GLUCOSE SERPL-MCNC: 88 MG/DL (ref 65–99)
HCT VFR BLD AUTO: 41.8 % (ref 37.5–51)
HGB BLD-MCNC: 13.8 G/DL (ref 13–17.7)
IMM GRANULOCYTES # BLD AUTO: 1.3 10*3/MM3 (ref 0–0.05)
IMM GRANULOCYTES NFR BLD AUTO: 8.6 % (ref 0–0.5)
LDH SERPL-CCNC: 345 U/L (ref 135–225)
LYMPHOCYTES # BLD AUTO: 1.49 10*3/MM3 (ref 0.7–3.1)
LYMPHOCYTES NFR BLD AUTO: 9.9 % (ref 19.6–45.3)
MCH RBC QN AUTO: 28.8 PG (ref 26.6–33)
MCHC RBC AUTO-ENTMCNC: 33 G/DL (ref 31.5–35.7)
MCV RBC AUTO: 87.1 FL (ref 79–97)
MONOCYTES # BLD AUTO: 0.85 10*3/MM3 (ref 0.1–0.9)
MONOCYTES NFR BLD AUTO: 5.6 % (ref 5–12)
NEUTROPHILS NFR BLD AUTO: 11.14 10*3/MM3 (ref 1.7–7)
NEUTROPHILS NFR BLD AUTO: 73.7 % (ref 42.7–76)
NRBC BLD AUTO-RTO: 0.1 /100 WBC (ref 0–0.2)
PLATELET # BLD AUTO: 365 10*3/MM3 (ref 140–450)
PMV BLD AUTO: 9.5 FL (ref 6–12)
POTASSIUM SERPL-SCNC: 4.6 MMOL/L (ref 3.5–5.2)
PROT SERPL-MCNC: 6.4 G/DL (ref 6–8.5)
RBC # BLD AUTO: 4.8 10*6/MM3 (ref 4.14–5.8)
SODIUM SERPL-SCNC: 133 MMOL/L (ref 136–145)
WBC # BLD AUTO: 15.11 10*3/MM3 (ref 3.4–10.8)

## 2021-09-17 PROCEDURE — 80053 COMPREHEN METABOLIC PANEL: CPT | Performed by: INTERNAL MEDICINE

## 2021-09-17 PROCEDURE — 36415 COLL VENOUS BLD VENIPUNCTURE: CPT | Performed by: INTERNAL MEDICINE

## 2021-09-17 PROCEDURE — 83615 LACTATE (LD) (LDH) ENZYME: CPT | Performed by: INTERNAL MEDICINE

## 2021-09-17 PROCEDURE — 85379 FIBRIN DEGRADATION QUANT: CPT | Performed by: INTERNAL MEDICINE

## 2021-09-17 PROCEDURE — 85025 COMPLETE CBC W/AUTO DIFF WBC: CPT | Performed by: INTERNAL MEDICINE

## 2021-09-17 PROCEDURE — 82248 BILIRUBIN DIRECT: CPT | Performed by: INTERNAL MEDICINE

## 2021-09-17 PROCEDURE — 94799 UNLISTED PULMONARY SVC/PX: CPT

## 2021-09-17 PROCEDURE — 63710000001 DEXAMETHASONE PER 0.25 MG: Performed by: INTERNAL MEDICINE

## 2021-09-17 PROCEDURE — 25010000002 ENOXAPARIN PER 10 MG: Performed by: INTERNAL MEDICINE

## 2021-09-17 PROCEDURE — 94760 N-INVAS EAR/PLS OXIMETRY 1: CPT

## 2021-09-17 PROCEDURE — 86140 C-REACTIVE PROTEIN: CPT | Performed by: INTERNAL MEDICINE

## 2021-09-17 RX ADMIN — DEXAMETHASONE 6 MG: 2 TABLET ORAL at 09:48

## 2021-09-17 RX ADMIN — ALBUTEROL SULFATE 2 PUFF: 90 AEROSOL, METERED RESPIRATORY (INHALATION) at 20:40

## 2021-09-17 RX ADMIN — ENOXAPARIN SODIUM 40 MG: 40 INJECTION SUBCUTANEOUS at 00:06

## 2021-09-17 RX ADMIN — ALBUTEROL SULFATE 2 PUFF: 90 AEROSOL, METERED RESPIRATORY (INHALATION) at 14:26

## 2021-09-17 RX ADMIN — ALBUTEROL SULFATE 2 PUFF: 90 AEROSOL, METERED RESPIRATORY (INHALATION) at 10:13

## 2021-09-17 RX ADMIN — ALBUTEROL SULFATE 2 PUFF: 90 AEROSOL, METERED RESPIRATORY (INHALATION) at 06:41

## 2021-09-17 NOTE — PROGRESS NOTES
[facility] Medicine Services  INPATIENT PROGRESS NOTE    Length of Stay: 5  Date of Admission: 9/12/2021  Primary Care Physician: Provider, No Known    Subjective   Chief Complaint:  Shortness of breath   HPI: Feeling some better today.    Review of Systems   Constitutional: Positive for activity change, appetite change and fatigue. Negative for chills and fever.   Respiratory: Positive for cough and shortness of breath. Negative for chest tightness.    Cardiovascular: Negative for chest pain, palpitations and leg swelling.   Gastrointestinal: Negative for abdominal pain, constipation, diarrhea, nausea and vomiting.   Skin: Negative for wound.   Neurological: Negative for dizziness, weakness, light-headedness, numbness and headaches.        All pertinent negatives and positives are as above. All other systems have been reviewed and are negative unless otherwise stated.     Objective    Temp:  [96.1 °F (35.6 °C)-97.8 °F (36.6 °C)] 96.1 °F (35.6 °C)  Heart Rate:  [65-97] 74  Resp:  [16-20] 18  BP: ()/(60-69) 96/60    Physical Exam  Vitals reviewed.   Constitutional:       Appearance: He is well-developed.   HENT:      Head: Normocephalic and atraumatic.   Eyes:      Pupils: Pupils are equal, round, and reactive to light.   Cardiovascular:      Rate and Rhythm: Normal rate and regular rhythm.      Heart sounds: Normal heart sounds. No murmur heard.   No friction rub. No gallop.    Pulmonary:      Effort: Pulmonary effort is normal. No respiratory distress.      Breath sounds: Decreased breath sounds present. No wheezing or rales.   Chest:      Chest wall: No tenderness.   Abdominal:      General: Bowel sounds are normal. There is no distension.      Palpations: Abdomen is soft.      Tenderness: There is no abdominal tenderness.   Musculoskeletal:      Cervical back: Normal range of motion and neck supple.   Psychiatric:         Behavior: Behavior normal.         Results  Review:  I have reviewed the labs, radiology results, and diagnostic studies.    Laboratory Data:   Results from last 7 days   Lab Units 09/17/21  0515 09/16/21  0553 09/15/21  0551   SODIUM mmol/L 133* 139 136   POTASSIUM mmol/L 4.6 4.8 4.7   CHLORIDE mmol/L 96* 102 99   CO2 mmol/L 30.0* 29.0 29.0   BUN mg/dL 28* 29* 30*   CREATININE mg/dL 0.92 1.05 0.95   GLUCOSE mg/dL 88 99 99   CALCIUM mg/dL 9.0 9.4 9.2   BILIRUBIN mg/dL 0.3 0.3 0.3   ALK PHOS U/L 71 68 68   ALT (SGPT) U/L 85* 123* 188*   AST (SGOT) U/L 18 29 89*   ANION GAP mmol/L 7.0 8.0 8.0     Estimated Creatinine Clearance: 132 mL/min (by C-G formula based on SCr of 0.92 mg/dL).          Results from last 7 days   Lab Units 09/17/21  0515 09/16/21  0553 09/15/21  0551 09/14/21  0513 09/12/21 2019   WBC 10*3/mm3 15.11* 15.47* 16.09* 14.75* 5.97   HEMOGLOBIN g/dL 13.8 13.7 13.7 13.9 13.9   HEMATOCRIT % 41.8 42.2 41.3 41.6 41.9   PLATELETS 10*3/mm3 365 384 366 356 266           Culture Data:   No results found for: BLOODCX  No results found for: URINECX  No results found for: RESPCX  No results found for: WOUNDCX  No results found for: STOOLCX  No components found for: BODYFLD    Radiology Data:   Imaging Results (Last 24 Hours)     ** No results found for the last 24 hours. **          I have reviewed the patient's current medications.     Assessment/Plan     Active Hospital Problems    Diagnosis    • Pneumonia due to COVID-19 virus        Plan:  1.  Acute hypoxic respiratory failure: Improving.  Secondary to COVID-19 pneumonia.  Currently weaned down to 1 L per nasal cannula.  Oxygen saturation acceptable.  CTA shows no PE.  2.  COVID-19 pneumonia: Continue dexamethasone, remdesivir, and bronchodilators.  Follow inflammatory labs.  Currently trending down.  3.  DVT prophylaxis: Lovenox.    The patient was evaluated during the global COVID-19 pandemic, and the diagnosis was suspected/considered upon their initial presentation.  Evaluation, treatment, and  testing were consistent with current guidelines for patients who present with complaints or symptoms that may be related to COVID-19.    I confirmed that the patient's Advance Care Plan is present, code status is documented, or surrogate decision maker is listed in the patient's medical record.       Discharge Planning: I expect patient to be discharged to home in 1-2 days.        This document has been electronically signed by Cuco Darden MD on September 17, 2021 16:04 CDT

## 2021-09-17 NOTE — PLAN OF CARE
Goal Outcome Evaluation:           Progress: improving  Outcome Summary: pt on NC 3Ll Sats are stayting in the low 90s. no C/O SOA. will continue to monitor.

## 2021-09-18 ENCOUNTER — READMISSION MANAGEMENT (OUTPATIENT)
Dept: CALL CENTER | Facility: HOSPITAL | Age: 36
End: 2021-09-18

## 2021-09-18 VITALS
BODY MASS INDEX: 27.65 KG/M2 | DIASTOLIC BLOOD PRESSURE: 58 MMHG | HEIGHT: 69 IN | WEIGHT: 186.7 LBS | TEMPERATURE: 97 F | RESPIRATION RATE: 18 BRPM | OXYGEN SATURATION: 94 % | SYSTOLIC BLOOD PRESSURE: 118 MMHG | HEART RATE: 77 BPM

## 2021-09-18 PROBLEM — D89.832 CYTOKINE RELEASE SYNDROME, GRADE 2: Status: ACTIVE | Noted: 2021-09-18

## 2021-09-18 LAB
ALBUMIN SERPL-MCNC: 3.3 G/DL (ref 3.5–5.2)
ALP SERPL-CCNC: 76 U/L (ref 39–117)
ALT SERPL W P-5'-P-CCNC: 62 U/L (ref 1–41)
AST SERPL-CCNC: 17 U/L (ref 1–40)
BILIRUB CONJ SERPL-MCNC: <0.2 MG/DL (ref 0–0.3)
BILIRUB INDIRECT SERPL-MCNC: ABNORMAL MG/DL
BILIRUB SERPL-MCNC: 0.2 MG/DL (ref 0–1.2)
CREAT SERPL-MCNC: 0.97 MG/DL (ref 0.76–1.27)
GFR SERPL CREATININE-BSD FRML MDRD: 88 ML/MIN/1.73
PROT SERPL-MCNC: 6.1 G/DL (ref 6–8.5)

## 2021-09-18 PROCEDURE — 82565 ASSAY OF CREATININE: CPT | Performed by: INTERNAL MEDICINE

## 2021-09-18 PROCEDURE — 94799 UNLISTED PULMONARY SVC/PX: CPT

## 2021-09-18 PROCEDURE — 25010000002 ENOXAPARIN PER 10 MG: Performed by: INTERNAL MEDICINE

## 2021-09-18 PROCEDURE — 80076 HEPATIC FUNCTION PANEL: CPT | Performed by: INTERNAL MEDICINE

## 2021-09-18 PROCEDURE — 94760 N-INVAS EAR/PLS OXIMETRY 1: CPT

## 2021-09-18 PROCEDURE — 63710000001 DEXAMETHASONE PER 0.25 MG: Performed by: INTERNAL MEDICINE

## 2021-09-18 RX ORDER — DEXAMETHASONE 6 MG/1
6 TABLET ORAL DAILY
Qty: 4 TABLET | Refills: 0 | Status: SHIPPED | OUTPATIENT
Start: 2021-09-19 | End: 2021-09-23

## 2021-09-18 RX ADMIN — ALBUTEROL SULFATE 2 PUFF: 90 AEROSOL, METERED RESPIRATORY (INHALATION) at 08:40

## 2021-09-18 RX ADMIN — DEXAMETHASONE 6 MG: 2 TABLET ORAL at 08:35

## 2021-09-18 RX ADMIN — ENOXAPARIN SODIUM 40 MG: 40 INJECTION SUBCUTANEOUS at 00:39

## 2021-09-18 NOTE — OUTREACH NOTE
Prep Survey      Responses   [facility] facility patient discharged from?  [facility]   Is LACE score < 7 ?  No   Emergency Room discharge w/ pulse ox?  No   Eligibility  Readm Mgmt   Discharge diagnosis  Acute hypoxic respiratory failure,    COVID-19 pneumonia   Does the patient have one of the following disease processes/diagnoses(primary or secondary)?  COVID-19   Does the patient have Home health ordered?  No   Is there a DME ordered?  No   Prep survey completed?  Yes          Vero Cárdenas RN

## 2021-09-18 NOTE — PLAN OF CARE
Goal Outcome Evaluation:        Patient is on room air maintaining sats, expected discharge today.

## 2021-09-18 NOTE — DISCHARGE SUMMARY
[facility] Medicine Services  DISCHARGE SUMMARY       Date of Admission: 9/12/2021  Date of Discharge:  9/18/2021  Primary Care Physician: Provider, No Known    Presenting Problem/History of Present Illness:  Hypoxia [R09.02]  Pneumonia due to COVID-19 virus [U07.1, J12.82]       Final Discharge Diagnoses:  Active Hospital Problems    Diagnosis    • Cytokine release syndrome, grade 2    • Pneumonia due to COVID-19 virus        Consults:   Consults     No orders found from 8/14/2021 to 9/13/2021.          Pertinent Test Results:   Lab Results (most recent)     Procedure Component Value Units Date/Time    Hepatic Function Panel [[id number]]  (Abnormal) Collected: 09/18/21 0611    Specimen: Blood Updated: 09/18/21 0657     Total Protein 6.1 g/dL      Albumin 3.30 g/dL      ALT (SGPT) 62 U/L      AST (SGOT) 17 U/L      Alkaline Phosphatase 76 U/L      Total Bilirubin 0.2 mg/dL      Bilirubin, Direct <0.2 mg/dL      Bilirubin, Indirect --     Comment: Unable to calculate       Creatinine, Serum [[id number]]  (Normal) Collected: 09/18/21 0611    Specimen: Blood Updated: 09/18/21 0657     Creatinine 0.97 mg/dL      eGFR Non African Amer 88 mL/min/1.73     Narrative:      GFR Normal >60  Chronic Kidney Disease <60  Kidney Failure <15      Lactate Dehydrogenase [[id number]]  (Abnormal) Collected: 09/17/21 0515    Specimen: Blood Updated: 09/17/21 0728      U/L      Comment: Specimen hemolyzed.  Results may be affected.       Comprehensive Metabolic Panel [[id number]]  (Abnormal) Collected: 09/17/21 0515    Specimen: Blood Updated: 09/17/21 0724     Glucose 88 mg/dL      BUN 28 mg/dL      Creatinine 0.92 mg/dL      Sodium 133 mmol/L      Potassium 4.6 mmol/L      Chloride 96 mmol/L      CO2 30.0 mmol/L      Calcium 9.0 mg/dL      Total Protein 6.4 g/dL      Albumin 3.50 g/dL      ALT (SGPT) 85 U/L      AST (SGOT) 18 U/L      Alkaline Phosphatase 71 U/L      Total Bilirubin 0.3  mg/dL      eGFR Non African Amer 93 mL/min/1.73      Globulin 2.9 gm/dL      A/G Ratio 1.2 g/dL      BUN/Creatinine Ratio 30.4     Anion Gap 7.0 mmol/L     Narrative:      GFR Normal >60  Chronic Kidney Disease <60  Kidney Failure <15      C-reactive Protein [[id number]]  (Abnormal) Collected: 09/17/21 0515    Specimen: Blood Updated: 09/17/21 0724     C-Reactive Protein 3.56 mg/dL     Bilirubin, Direct [[id number]]  (Normal) Collected: 09/17/21 0515    Specimen: Blood Updated: 09/17/21 0724     Bilirubin, Direct <0.2 mg/dL     D-dimer, Quantitative [[id number]]  (Normal) Collected: 09/17/21 0515    Specimen: Blood Updated: 09/17/21 0721     D-Dimer, Quantitative 422 ng/mL (FEU)     Narrative:      Dimer values <500 ng/ml FEU are FDA approved as aid in diagnosis of deep venous thrombosis and pulmonary embolism.  This test should not be used in an exclusion strategy with pretest probability alone.    A recent guideline regarding diagnosis for pulmonary thromboembolism recommends an adjusted exclusion criterion of age x 10 ng/ml FEU for patients >50 years of age ([employer] Intern Med 2015; 163: 701-711).      CBC & Differential [[id number]]  (Abnormal) Collected: 09/17/21 0515    Specimen: Blood Updated: 09/17/21 0705    Narrative:      The following orders were created for panel order CBC & Differential.  Procedure                               Abnormality         Status                     ---------                               -----------         ------                     CBC Auto Differential[[id number]]        Abnormal            Final result               Scan Slide[[id number]]                                                                    Please view results for these tests on the individual orders.    CBC Auto Differential [[id number]]  (Abnormal) Collected: 09/17/21 0515    Specimen: Blood Updated: 09/17/21 0705     WBC 15.11 10*3/mm3      RBC 4.80 10*6/mm3      Hemoglobin 13.8 g/dL      Hematocrit 41.8 %      MCV  87.1 fL      MCH 28.8 pg      MCHC 33.0 g/dL      RDW 13.3 %      RDW-SD 42.2 fl      MPV 9.5 fL      Platelets 365 10*3/mm3      Neutrophil % 73.7 %      Lymphocyte % 9.9 %      Monocyte % 5.6 %      Eosinophil % 1.7 %      Basophil % 0.5 %      Immature Grans % 8.6 %      Neutrophils, Absolute 11.14 10*3/mm3      Lymphocytes, Absolute 1.49 10*3/mm3      Monocytes, Absolute 0.85 10*3/mm3      Eosinophils, Absolute 0.26 10*3/mm3      Basophils, Absolute 0.07 10*3/mm3      Immature Grans, Absolute 1.30 10*3/mm3      nRBC 0.1 /100 WBC     Manual Differential [[id number]]  (Abnormal) Collected: 09/16/21 0553    Specimen: Blood Updated: 09/16/21 0724     Neutrophil % 82.0 %      Lymphocyte % 8.0 %      Monocyte % 4.0 %      Eosinophil % 1.0 %      Bands %  5.0 %      Neutrophils Absolute 13.46 10*3/mm3      Lymphocytes Absolute 1.24 10*3/mm3      Monocytes Absolute 0.62 10*3/mm3      Eosinophils Absolute 0.15 10*3/mm3      Anisocytosis Slight/1+     WBC Morphology Normal     Platelet Estimate Adequate    Comprehensive Metabolic Panel [[id number]]  (Abnormal) Collected: 09/16/21 0553    Specimen: Blood Updated: 09/16/21 0657     Glucose 99 mg/dL      BUN 29 mg/dL      Creatinine 1.05 mg/dL      Sodium 139 mmol/L      Potassium 4.8 mmol/L      Chloride 102 mmol/L      CO2 29.0 mmol/L      Calcium 9.4 mg/dL      Total Protein 6.6 g/dL      Albumin 3.40 g/dL      ALT (SGPT) 123 U/L      AST (SGOT) 29 U/L      Alkaline Phosphatase 68 U/L      Total Bilirubin 0.3 mg/dL      eGFR Non African Amer 80 mL/min/1.73      Globulin 3.2 gm/dL      A/G Ratio 1.1 g/dL      BUN/Creatinine Ratio 27.6     Anion Gap 8.0 mmol/L     Narrative:      GFR Normal >60  Chronic Kidney Disease <60  Kidney Failure <15      Bilirubin, Direct [[id number]]  (Normal) Collected: 09/16/21 0553    Specimen: Blood Updated: 09/16/21 0657     Bilirubin, Direct <0.2 mg/dL     C-reactive Protein [[id number]]  (Abnormal) Collected: 09/16/21 0553    Specimen: Blood  Updated: 09/16/21 0657     C-Reactive Protein 1.88 mg/dL     CBC & Differential [[id number]]  (Abnormal) Collected: 09/16/21 0553    Specimen: Blood Updated: 09/16/21 0637    Narrative:      The following orders were created for panel order CBC & Differential.  Procedure                               Abnormality         Status                     ---------                               -----------         ------                     CBC Auto Differential[[id number]]        Abnormal            Final result               Scan Slide[[id number]]                                                                    Please view results for these tests on the individual orders.    CBC Auto Differential [[id number]]  (Abnormal) Collected: 09/16/21 0553    Specimen: Blood Updated: 09/16/21 0637     WBC 15.47 10*3/mm3      RBC 4.88 10*6/mm3      Hemoglobin 13.7 g/dL      Hematocrit 42.2 %      MCV 86.5 fL      MCH 28.1 pg      MCHC 32.5 g/dL      RDW 13.4 %      RDW-SD 42.1 fl      MPV 9.6 fL      Platelets 384 10*3/mm3     Lactate Dehydrogenase [[id number]]  (Abnormal) Collected: 09/15/21 0551    Specimen: Blood Updated: 09/15/21 0649      U/L     D-dimer, Quantitative [[id number]]  (Abnormal) Collected: 09/15/21 0551    Specimen: Blood Updated: 09/15/21 0641     D-Dimer, Quantitative 504 ng/mL (FEU)     Narrative:      Dimer values <500 ng/ml FEU are FDA approved as aid in diagnosis of deep venous thrombosis and pulmonary embolism.  This test should not be used in an exclusion strategy with pretest probability alone.    A recent guideline regarding diagnosis for pulmonary thromboembolism recommends an adjusted exclusion criterion of age x 10 ng/ml FEU for patients >50 years of age ([employer] Med 2015; 163: 701-711).      Extra Tubes [[id number]] Collected: 09/13/21 1346    Specimen: Blood, Venous Line Updated: 09/13/21 1500    Narrative:      The following orders were created for panel order Extra Tubes.  Procedure                                Abnormality         Status                     ---------                               -----------         ------                     Lavender Top[[id number]]                                     Final result                 Please view results for these tests on the individual orders.    Lavender Top [[id number]] Collected: 09/13/21 1346    Specimen: Blood Updated: 09/13/21 1500     Extra Tube hold for add-on     Comment: Auto resulted       Creatinine, Serum [[id number]]  (Normal) Collected: 09/13/21 1341    Specimen: Blood Updated: 09/13/21 1405     Creatinine 1.00 mg/dL      eGFR Non African Amer 85 mL/min/1.73     Narrative:      GFR Normal >60  Chronic Kidney Disease <60  Kidney Failure <15      Hepatic Function Panel [[id number]]  (Abnormal) Collected: 09/13/21 1341    Specimen: Blood Updated: 09/13/21 1405     Total Protein 7.2 g/dL      Albumin 3.80 g/dL      ALT (SGPT) 50 U/L      AST (SGOT) 39 U/L      Alkaline Phosphatase 57 U/L      Total Bilirubin 0.2 mg/dL      Bilirubin, Direct <0.2 mg/dL      Bilirubin, Indirect --     Comment: Unable to calculate       Extra Tubes [[id number]] Collected: 09/12/21 2019    Specimen: Blood Updated: 09/12/21 2130    Narrative:      The following orders were created for panel order Extra Tubes.  Procedure                               Abnormality         Status                     ---------                               -----------         ------                     Lavender Top[[id number]]                                     Final result               Gold Top - SST[[id number]]                                   Final result                 Please view results for these tests on the individual orders.    Lavender Top [[id number]] Collected: 09/12/21 2019    Specimen: Blood Updated: 09/12/21 2130     Extra Tube hold for add-on     Comment: Auto resulted       Gold Top - SST [[id number]] Collected: 09/12/21 2019    Specimen: Blood Updated: 09/12/21 2130     Extra  "Tube Hold for add-ons.     Comment: Auto resulted.       Blood Gas, Arterial - [[id number]]  (Abnormal) Collected: 09/12/21 2056    Specimen: Arterial Blood Updated: 09/12/21 2059     Site Left Radial     Yaron's Test N/A     pH, Arterial 7.453 pH units      Comment: 83 Value above reference range        pCO2, Arterial 36.6 mm Hg      pO2, Arterial 57.3 mm Hg      Comment: 84 Value below reference range        HCO3, Arterial 25.6 mmol/L      Base Excess, Arterial 1.8 mmol/L      O2 Saturation, Arterial 90.9 %      Comment: 84 Value below reference range        Barometric Pressure for Blood Gas 751 mmHg      Modality Room Air     Ventilator Mode NA     Collected by [facility]IN[facility]     Comment: Meter: [id number]     :  [id number]       Procalcitonin [[id number]]  (Normal) Collected: 09/12/21 2013    Specimen: Blood Updated: 09/12/21 2045     Procalcitonin 0.13 ng/mL     Narrative:      As a Marker for Sepsis (Non-Neonates):     1. <0.5 ng/mL represents a low risk of severe sepsis and/or septic shock.  2. >2 ng/mL represents a high risk of severe sepsis and/or septic shock.    As a Marker for Lower Respiratory Tract Infections that require antibiotic therapy:  PCT on Admission     Antibiotic Therapy             6-12 Hrs later  >0.5                          Strongly Recommended            >0.25 - <0.5             Recommended  0.1 - 0.25                  Discouraged                       Remeasure/reassess PCT  <0.1                         Strongly Discouraged         Remeasure/reassess PCT      As 28 day mortality risk marker: \"Change in Procalcitonin Result\" (>80% or <=80%) if Day 0 (or Day 1) and Day 4 values are available. Refer to http://www.[employer]-pct-calculator.com/    Change in PCT <=80 %   A decrease of PCT levels below or equal to 80% defines a positive change in PCT test result representing a higher risk for 28-day all-cause mortality of patients diagnosed with severe sepsis or septic shock.    Change in PCT " >80 %   A decrease of PCT levels of more than 80% defines a negative change in PCT result representing a lower risk for 28-day all-cause mortality of patients diagnosed with severe sepsis or septic shock.              Results may be falsely decreased if patient taking Biotin.         Imaging Results (Most Recent)     Procedure Component Value Units Date/Time    CT Angiogram Chest [[id number]] Collected: 09/12/21 2120     Updated: 09/12/21 2217    Narrative:      EXAM:    CT Angiography Chest With Intravenous Contrast    CLINICAL HISTORY:    The patient is 36 years old and is Male; d dimer (+)/soa    TECHNIQUE:    Axial computed tomographic angiography images of the chest with  intravenous contrast.  Sagittal and coronal reformatted images  were created and reviewed.  This CT exam was performed using one  or more of the following dose reduction techniques:  automated  exposure control, adjustment of the mA and/or kV according to  patient size, and/or use of iterative reconstruction technique.    MIP reconstructed images were created and reviewed.    COMPARISON:    No relevant prior studies available.    FINDINGS:    PULMONARY ARTERIES:  There are no obvious filling defects  identified within the pulmonary arteries to suggest pulmonary  embolism.    AORTA:  No acute findings.  No thoracic aortic aneurysm.    LUNGS:  Diffuse groundglass opacities are present throughout  the lungs. Smooth interlobular thickening is noted.    PLEURAL SPACE:  Unremarkable.  No significant effusion.  No  pneumothorax.    HEART:  Unremarkable.  No cardiomegaly.  No significant  pericardial effusion.  No evidence of RV dysfunction.    MEDIASTINUM:  The tracheobronchial tree is widely patent.    BONES/JOINTS:  No acute fracture.  No dislocation.    SOFT TISSUES:  Unremarkable.    LYMPH NODES:  Unremarkable.  No enlarged lymph nodes.      Impression:      1.  Diffuse groundglass opacities.  Commonly reported imaging  features of (COVID-19 or  "viral) pneumonia are present. Other  processes such as influenza pneumonia and organizing pneumonia,  as can be seen with drug toxicity and connective tissue disease,  can cause a similar imaging pattern. [id number]  2.  No evidence of pulmonary embolism.    Electronically signed by:  Genet Richard MD  9/12/2021 10:16 PM  CDT Workstation: [id number]          Chief Complaint on Day of Discharge: Improving fatigue    Hospital Course:  The patient is a 36 y.o. male who presented to [facility] with shortness of breath.  He had had symptoms for 10 to 12 days prior to presentation and had progressive shortness of breath prior to presentation.  He required supplemental oxygen.  He had known diagnosis of COVID-19.  Patient was started on dexamethasone and Lovenox.  Supplemental oxygen requirement increased to 10 L per high flow nasal cannula.  Patient was weaned down from that.  Exercise tolerance improved and he was able to be weaned to room air.  Once he was weaned to room air, he felt much better and was discharged home in good condition.  Upon questioning, patient did verbalize his intent to get his COVID-19 vaccine as soon as possible.    Condition on Discharge: Stable    Physical Exam on Discharge:  /58 (BP Location: Left arm, Patient Position: Lying)   Pulse 77   Temp 97 °F (36.1 °C) (Oral)   Resp 18   Ht 175.3 cm (69\")   Wt 84.7 kg (186 lb 11.2 oz)   SpO2 94%   BMI 27.57 kg/m²      Physical Exam  Vitals reviewed.   Constitutional:       Appearance: He is well-developed.   HENT:      Head: Normocephalic and atraumatic.   Eyes:      Pupils: Pupils are equal, round, and reactive to light.   Cardiovascular:      Rate and Rhythm: Normal rate and regular rhythm.      Heart sounds: Normal heart sounds. No murmur heard.   No friction rub. No gallop.    Pulmonary:      Effort: Pulmonary effort is normal. No respiratory distress.      Breath sounds: Normal breath sounds. No wheezing or rales. "   Chest:      Chest wall: No tenderness.   Abdominal:      General: Bowel sounds are normal. There is no distension.      Palpations: Abdomen is soft.      Tenderness: There is no abdominal tenderness.   Musculoskeletal:      Cervical back: Normal range of motion and neck supple.   Psychiatric:         Behavior: Behavior normal.           Discharge Disposition:  Home or Self Care    Discharge Medications:     Discharge Medications      New Medications      Instructions Start Date   dexamethasone 6 MG tablet  Commonly known as: DECADRON   6 mg, Oral, Daily   Start Date: September 19, 2021        Continue These Medications      Instructions Start Date   HYDROcodone-acetaminophen 5-325 MG per tablet  Commonly known as: NORCO   1 tablet, Oral, Every 6 Hours PRN             Discharge Diet:   Diet Instructions     Advance Diet As Tolerated            Activity at Discharge:   Activity Instructions     Activity as Tolerated            Follow-up Appointments:   PCP of choice in 1 to 2 weeks          This document has been electronically signed by Cuco Darden MD on September 18, 2021 14:13 CDT      Time: 35 minutes

## 2021-09-19 ENCOUNTER — READMISSION MANAGEMENT (OUTPATIENT)
Dept: CALL CENTER | Facility: HOSPITAL | Age: 36
End: 2021-09-19

## 2021-09-19 NOTE — OUTREACH NOTE
COVID-19 Week 1 Survey      Responses   [facility] patient discharged from?  [place]   Does the patient have one of the following disease processes/diagnoses(primary or secondary)?  COVID-19   COVID-19 underlying condition?  None   Call Number  Call 1   Week 1 Call successful?  No [BOTH PATIENT'S AND EMERGENCY CONTACT NUMBERS ANSWERED BY RECORDINGS. NO MESSAGES LEFT]   Discharge diagnosis  Acute hypoxic respiratory failure,    COVID-19 pneumonia          Chey Hodge RN

## 2021-09-20 ENCOUNTER — READMISSION MANAGEMENT (OUTPATIENT)
Dept: CALL CENTER | Facility: HOSPITAL | Age: 36
End: 2021-09-20

## 2021-09-20 NOTE — PAYOR COMM NOTE
"Ninoska Ardon  Case Management Extender  [phone] phone  [phone] fax      Auth# [id number]    Sasha De La Vega (36 y.o. Male)     Date of Birth Social Security Number Address Home Phone MRN    [id number]  [address]  [facility] [address] [phone] [id number]    Episcopal Marital Status          Sabianism        Admission Date Admission Type Admitting Provider Attending Provider Department, Room/Bed    9/12/21 Emergency Joaquim Rae Jr., MD  [facility] 4 [place], 422/1    Discharge Date Discharge Disposition Discharge Destination        9/18/2021 Home or Self Care              Attending Provider: (none)   Allergies: Chocolate    Isolation: None   Infection: COVID (confirmed) (09/12/21)   Code Status: Prior    Ht: 175.3 cm (69\")   Wt: 84.7 kg (186 lb 11.2 oz)    Admission Cmt: None   Principal Problem: None                Active Insurance as of 9/12/2021     Primary Coverage     Payor Plan Insurance Group Employer/Plan Group    [employer] [place] [employer] [place]ICA[place]      Payor Plan Address Payor Plan Phone Number Payor Plan Fax Number Effective Dates    [address] [address] [phone]  9/12/2021 - None Entered    [facility] [address]       Subscriber Name Subscriber Birth Date Member ID       SASHA DE LA VEGA 1985 [id number]                 Emergency Contacts      (Rel.) Home Phone Work Phone Mobile Phone    MARTÍN BELCHER (Significant Other) [phone] -- [phone]               Discharge Summary      Cuco Darden MD at 09/18/21 [facility]              [facility] Medicine Services  DISCHARGE SUMMARY       Date of Admission: 9/12/2021  Date of Discharge:  9/18/2021  Primary Care Physician: Provider, No Known    Presenting Problem/History of Present Illness:  Hypoxia [R09.02]  Pneumonia due to COVID-19 virus [U07.1, J12.82]       Final Discharge Diagnoses:  Active Hospital Problems    " Diagnosis    • Cytokine release syndrome, grade 2    • Pneumonia due to COVID-19 virus        Consults:   Consults     No orders found from 8/14/2021 to 9/13/2021.          Pertinent Test Results:   Lab Results (most recent)     Procedure Component Value Units Date/Time    Hepatic Function Panel [[id number]]  (Abnormal) Collected: 09/18/21 0611    Specimen: Blood Updated: 09/18/21 0657     Total Protein 6.1 g/dL      Albumin 3.30 g/dL      ALT (SGPT) 62 U/L      AST (SGOT) 17 U/L      Alkaline Phosphatase 76 U/L      Total Bilirubin 0.2 mg/dL      Bilirubin, Direct <0.2 mg/dL      Bilirubin, Indirect --     Comment: Unable to calculate       Creatinine, Serum [[id number]]  (Normal) Collected: 09/18/21 0611    Specimen: Blood Updated: 09/18/21 0657     Creatinine 0.97 mg/dL      eGFR Non African Amer 88 mL/min/1.73     Narrative:      GFR Normal >60  Chronic Kidney Disease <60  Kidney Failure <15      Lactate Dehydrogenase [[id number]]  (Abnormal) Collected: 09/17/21 0515    Specimen: Blood Updated: 09/17/21 0728      U/L      Comment: Specimen hemolyzed.  Results may be affected.       Comprehensive Metabolic Panel [[id number]]  (Abnormal) Collected: 09/17/21 0515    Specimen: Blood Updated: 09/17/21 0724     Glucose 88 mg/dL      BUN 28 mg/dL      Creatinine 0.92 mg/dL      Sodium 133 mmol/L      Potassium 4.6 mmol/L      Chloride 96 mmol/L      CO2 30.0 mmol/L      Calcium 9.0 mg/dL      Total Protein 6.4 g/dL      Albumin 3.50 g/dL      ALT (SGPT) 85 U/L      AST (SGOT) 18 U/L      Alkaline Phosphatase 71 U/L      Total Bilirubin 0.3 mg/dL      eGFR Non African Amer 93 mL/min/1.73      Globulin 2.9 gm/dL      A/G Ratio 1.2 g/dL      BUN/Creatinine Ratio 30.4     Anion Gap 7.0 mmol/L     Narrative:      GFR Normal >60  Chronic Kidney Disease <60  Kidney Failure <15      C-reactive Protein [[id number]]  (Abnormal) Collected: 09/17/21 0515    Specimen: Blood Updated: 09/17/21 0724     C-Reactive Protein 3.56 mg/dL      Bilirubin, Direct [[id number]]  (Normal) Collected: 09/17/21 0515    Specimen: Blood Updated: 09/17/21 0724     Bilirubin, Direct <0.2 mg/dL     D-dimer, Quantitative [[id number]]  (Normal) Collected: 09/17/21 0515    Specimen: Blood Updated: 09/17/21 0721     D-Dimer, Quantitative 422 ng/mL (FEU)     Narrative:      Dimer values <500 ng/ml FEU are FDA approved as aid in diagnosis of deep venous thrombosis and pulmonary embolism.  This test should not be used in an exclusion strategy with pretest probability alone.    A recent guideline regarding diagnosis for pulmonary thromboembolism recommends an adjusted exclusion criterion of age x 10 ng/ml FEU for patients >50 years of age (Karen Intern Med 2015; 163: 701-711).      CBC & Differential [[id number]]  (Abnormal) Collected: 09/17/21 0515    Specimen: Blood Updated: 09/17/21 0705    Narrative:      The following orders were created for panel order CBC & Differential.  Procedure                               Abnormality         Status                     ---------                               -----------         ------                     CBC Auto Differential[[id number]]        Abnormal            Final result               Scan Slide[[id number]]                                                                    Please view results for these tests on the individual orders.    CBC Auto Differential [[id number]]  (Abnormal) Collected: 09/17/21 0515    Specimen: Blood Updated: 09/17/21 0705     WBC 15.11 10*3/mm3      RBC 4.80 10*6/mm3      Hemoglobin 13.8 g/dL      Hematocrit 41.8 %      MCV 87.1 fL      MCH 28.8 pg      MCHC 33.0 g/dL      RDW 13.3 %      RDW-SD 42.2 fl      MPV 9.5 fL      Platelets 365 10*3/mm3      Neutrophil % 73.7 %      Lymphocyte % 9.9 %      Monocyte % 5.6 %      Eosinophil % 1.7 %      Basophil % 0.5 %      Immature Grans % 8.6 %      Neutrophils, Absolute 11.14 10*3/mm3      Lymphocytes, Absolute 1.49 10*3/mm3      Monocytes, Absolute 0.85  10*3/mm3      Eosinophils, Absolute 0.26 10*3/mm3      Basophils, Absolute 0.07 10*3/mm3      Immature Grans, Absolute 1.30 10*3/mm3      nRBC 0.1 /100 WBC     Manual Differential [[id number]]  (Abnormal) Collected: 09/16/21 0553    Specimen: Blood Updated: 09/16/21 0724     Neutrophil % 82.0 %      Lymphocyte % 8.0 %      Monocyte % 4.0 %      Eosinophil % 1.0 %      Bands %  5.0 %      Neutrophils Absolute 13.46 10*3/mm3      Lymphocytes Absolute 1.24 10*3/mm3      Monocytes Absolute 0.62 10*3/mm3      Eosinophils Absolute 0.15 10*3/mm3      Anisocytosis Slight/1+     WBC Morphology Normal     Platelet Estimate Adequate    Comprehensive Metabolic Panel [[id number]]  (Abnormal) Collected: 09/16/21 0553    Specimen: Blood Updated: 09/16/21 0657     Glucose 99 mg/dL      BUN 29 mg/dL      Creatinine 1.05 mg/dL      Sodium 139 mmol/L      Potassium 4.8 mmol/L      Chloride 102 mmol/L      CO2 29.0 mmol/L      Calcium 9.4 mg/dL      Total Protein 6.6 g/dL      Albumin 3.40 g/dL      ALT (SGPT) 123 U/L      AST (SGOT) 29 U/L      Alkaline Phosphatase 68 U/L      Total Bilirubin 0.3 mg/dL      eGFR Non African Amer 80 mL/min/1.73      Globulin 3.2 gm/dL      A/G Ratio 1.1 g/dL      BUN/Creatinine Ratio 27.6     Anion Gap 8.0 mmol/L     Narrative:      GFR Normal >60  Chronic Kidney Disease <60  Kidney Failure <15      Bilirubin, Direct [[id number]]  (Normal) Collected: 09/16/21 0553    Specimen: Blood Updated: 09/16/21 0657     Bilirubin, Direct <0.2 mg/dL     C-reactive Protein [[id number]]  (Abnormal) Collected: 09/16/21 0553    Specimen: Blood Updated: 09/16/21 0657     C-Reactive Protein 1.88 mg/dL     CBC & Differential [[id number]]  (Abnormal) Collected: 09/16/21 0553    Specimen: Blood Updated: 09/16/21 0637    Narrative:      The following orders were created for panel order CBC & Differential.  Procedure                               Abnormality         Status                     ---------                                -----------         ------                     CBC Auto Differential[[id number]]        Abnormal            Final result               Scan Slide[[id number]]                                                                    Please view results for these tests on the individual orders.    CBC Auto Differential [[id number]]  (Abnormal) Collected: 09/16/21 0553    Specimen: Blood Updated: 09/16/21 0637     WBC 15.47 10*3/mm3      RBC 4.88 10*6/mm3      Hemoglobin 13.7 g/dL      Hematocrit 42.2 %      MCV 86.5 fL      MCH 28.1 pg      MCHC 32.5 g/dL      RDW 13.4 %      RDW-SD 42.1 fl      MPV 9.6 fL      Platelets 384 10*3/mm3     Lactate Dehydrogenase [[id number]]  (Abnormal) Collected: 09/15/21 0551    Specimen: Blood Updated: 09/15/21 0649      U/L     D-dimer, Quantitative [[id number]]  (Abnormal) Collected: 09/15/21 0551    Specimen: Blood Updated: 09/15/21 0641     D-Dimer, Quantitative 504 ng/mL (FEU)     Narrative:      Dimer values <500 ng/ml FEU are FDA approved as aid in diagnosis of deep venous thrombosis and pulmonary embolism.  This test should not be used in an exclusion strategy with pretest probability alone.    A recent guideline regarding diagnosis for pulmonary thromboembolism recommends an adjusted exclusion criterion of age x 10 ng/ml FEU for patients >50 years of age (Karen Intern Med 2015; 163: 701-711).      Extra Tubes [[id number]] Collected: 09/13/21 1346    Specimen: Blood, Venous Line Updated: 09/13/21 1500    Narrative:      The following orders were created for panel order Extra Tubes.  Procedure                               Abnormality         Status                     ---------                               -----------         ------                     Lavender Top[[id number]]                                     Final result                 Please view results for these tests on the individual orders.    Lavender Top [[id number]] Collected: 09/13/21 1346    Specimen: Blood Updated:  09/13/21 1500     Extra Tube hold for add-on     Comment: Auto resulted       Creatinine, Serum [[id number]]  (Normal) Collected: 09/13/21 1341    Specimen: Blood Updated: 09/13/21 1405     Creatinine 1.00 mg/dL      eGFR Non African Amer 85 mL/min/1.73     Narrative:      GFR Normal >60  Chronic Kidney Disease <60  Kidney Failure <15      Hepatic Function Panel [[id number]]  (Abnormal) Collected: 09/13/21 1341    Specimen: Blood Updated: 09/13/21 1405     Total Protein 7.2 g/dL      Albumin 3.80 g/dL      ALT (SGPT) 50 U/L      AST (SGOT) 39 U/L      Alkaline Phosphatase 57 U/L      Total Bilirubin 0.2 mg/dL      Bilirubin, Direct <0.2 mg/dL      Bilirubin, Indirect --     Comment: Unable to calculate       Extra Tubes [[id number]] Collected: 09/12/21 2019    Specimen: Blood Updated: 09/12/21 2130    Narrative:      The following orders were created for panel order Extra Tubes.  Procedure                               Abnormality         Status                     ---------                               -----------         ------                     Lavender Top[[id number]]                                     Final result               Gold Top - SST[[id number]]                                   Final result                 Please view results for these tests on the individual orders.    Lavender Top [[id number]] Collected: 09/12/21 2019    Specimen: Blood Updated: 09/12/21 2130     Extra Tube hold for add-on     Comment: Auto resulted       Gold Top - SST [[id number]] Collected: 09/12/21 2019    Specimen: Blood Updated: 09/12/21 2130     Extra Tube Hold for add-ons.     Comment: Auto resulted.       Blood Gas, Arterial - [[id number]]  (Abnormal) Collected: 09/12/21 2056    Specimen: Arterial Blood Updated: 09/12/21 2059     Site Left Radial     Yaron's Test N/A     pH, Arterial 7.453 pH units      Comment: 83 Value above reference range        pCO2, Arterial 36.6 mm Hg      pO2, Arterial 57.3 mm Hg      Comment: 84 Value  "below reference range        HCO3, Arterial 25.6 mmol/L      Base Excess, Arterial 1.8 mmol/L      O2 Saturation, Arterial 90.9 %      Comment: 84 Value below reference range        Barometric Pressure for Blood Gas 751 mmHg      Modality Room Air     Ventilator Mode NA     Collected by BINU     Comment: Meter: [id number]     :  [id number]       Procalcitonin [[id number]]  (Normal) Collected: 09/12/21 2013    Specimen: Blood Updated: 09/12/21 2045     Procalcitonin 0.13 ng/mL     Narrative:      As a Marker for Sepsis (Non-Neonates):     1. <0.5 ng/mL represents a low risk of severe sepsis and/or septic shock.  2. >2 ng/mL represents a high risk of severe sepsis and/or septic shock.    As a Marker for Lower Respiratory Tract Infections that require antibiotic therapy:  PCT on Admission     Antibiotic Therapy             6-12 Hrs later  >0.5                          Strongly Recommended            >0.25 - <0.5             Recommended  0.1 - 0.25                  Discouraged                       Remeasure/reassess PCT  <0.1                         Strongly Discouraged         Remeasure/reassess PCT      As 28 day mortality risk marker: \"Change in Procalcitonin Result\" (>80% or <=80%) if Day 0 (or Day 1) and Day 4 values are available. Refer to http://www.[employer] Technologiespct-calculator.com/    Change in PCT <=80 %   A decrease of PCT levels below or equal to 80% defines a positive change in PCT test result representing a higher risk for 28-day all-cause mortality of patients diagnosed with severe sepsis or septic shock.    Change in PCT >80 %   A decrease of PCT levels of more than 80% defines a negative change in PCT result representing a lower risk for 28-day all-cause mortality of patients diagnosed with severe sepsis or septic shock.              Results may be falsely decreased if patient taking Biotin.         Imaging Results (Most Recent)     Procedure Component Value Units Date/Time    CT Angiogram Chest " [[id number]] Collected: 09/12/21 2120     Updated: 09/12/21 2217    Narrative:      EXAM:    CT Angiography Chest With Intravenous Contrast    CLINICAL HISTORY:    The patient is 36 years old and is Male; d dimer (+)/soa    TECHNIQUE:    Axial computed tomographic angiography images of the chest with  intravenous contrast.  Sagittal and coronal reformatted images  were created and reviewed.  This CT exam was performed using one  or more of the following dose reduction techniques:  automated  exposure control, adjustment of the mA and/or kV according to  patient size, and/or use of iterative reconstruction technique.    MIP reconstructed images were created and reviewed.    COMPARISON:    No relevant prior studies available.    FINDINGS:    PULMONARY ARTERIES:  There are no obvious filling defects  identified within the pulmonary arteries to suggest pulmonary  embolism.    AORTA:  No acute findings.  No thoracic aortic aneurysm.    LUNGS:  Diffuse groundglass opacities are present throughout  the lungs. Smooth interlobular thickening is noted.    PLEURAL SPACE:  Unremarkable.  No significant effusion.  No  pneumothorax.    HEART:  Unremarkable.  No cardiomegaly.  No significant  pericardial effusion.  No evidence of RV dysfunction.    MEDIASTINUM:  The tracheobronchial tree is widely patent.    BONES/JOINTS:  No acute fracture.  No dislocation.    SOFT TISSUES:  Unremarkable.    LYMPH NODES:  Unremarkable.  No enlarged lymph nodes.      Impression:      1.  Diffuse groundglass opacities.  Commonly reported imaging  features of (COVID-19 or viral) pneumonia are present. Other  processes such as influenza pneumonia and organizing pneumonia,  as can be seen with drug toxicity and connective tissue disease,  can cause a similar imaging pattern. [id number]  2.  No evidence of pulmonary embolism.    Electronically signed by:  Genet Richard MD  9/12/2021 10:16 PM  CDT Workstation: [id number]          Chief Complaint on Day of  "Discharge: Improving fatigue    Hospital Course:  The patient is a 36 y.o. male who presented to [facility] with shortness of breath.  He had had symptoms for 10 to 12 days prior to presentation and had progressive shortness of breath prior to presentation.  He required supplemental oxygen.  He had known diagnosis of COVID-19.  Patient was started on dexamethasone and Lovenox.  Supplemental oxygen requirement increased to 10 L per high flow nasal cannula.  Patient was weaned down from that.  Exercise tolerance improved and he was able to be weaned to room air.  Once he was weaned to room air, he felt much better and was discharged home in good condition.  Upon questioning, patient did verbalize his intent to get his COVID-19 vaccine as soon as possible.    Condition on Discharge: Stable    Physical Exam on Discharge:  /58 (BP Location: Left arm, Patient Position: Lying)   Pulse 77   Temp 97 °F (36.1 °C) (Oral)   Resp 18   Ht 175.3 cm (69\")   Wt 84.7 kg (186 lb 11.2 oz)   SpO2 94%   BMI 27.57 kg/m²      Physical Exam  Vitals reviewed.   Constitutional:       Appearance: He is well-developed.   HENT:      Head: Normocephalic and atraumatic.   Eyes:      Pupils: Pupils are equal, round, and reactive to light.   Cardiovascular:      Rate and Rhythm: Normal rate and regular rhythm.      Heart sounds: Normal heart sounds. No murmur heard.   No friction rub. No gallop.    Pulmonary:      Effort: Pulmonary effort is normal. No respiratory distress.      Breath sounds: Normal breath sounds. No wheezing or rales.   Chest:      Chest wall: No tenderness.   Abdominal:      General: Bowel sounds are normal. There is no distension.      Palpations: Abdomen is soft.      Tenderness: There is no abdominal tenderness.   Musculoskeletal:      Cervical back: Normal range of motion and neck supple.   Psychiatric:         Behavior: Behavior normal.           Discharge Disposition:  Home or Self " Care    Discharge Medications:     Discharge Medications      New Medications      Instructions Start Date   dexamethasone 6 MG tablet  Commonly known as: DECADRON   6 mg, Oral, Daily   Start Date: September 19, 2021        Continue These Medications      Instructions Start Date   HYDROcodone-acetaminophen 5-325 MG per tablet  Commonly known as: NORCO   1 tablet, Oral, Every 6 Hours PRN             Discharge Diet:   Diet Instructions     Advance Diet As Tolerated            Activity at Discharge:   Activity Instructions     Activity as Tolerated            Follow-up Appointments:   PCP of choice in 1 to 2 weeks          This document has been electronically signed by Cuco Darden MD on September 18, 2021 14:13 CDT      Time: 35 minutes                Electronically signed by Cuco Darden MD at 09/18/21 142

## 2021-09-20 NOTE — OUTREACH NOTE
COVID-19 Week 1 Survey      Responses   [facility] patient discharged from?  [place]   Does the patient have one of the following disease processes/diagnoses(primary or secondary)?  COVID-19   COVID-19 underlying condition?  None   Call Number  Call 2   Week 1 Call successful?  Yes   Call start time  0922   Call end time  0925   Discharge diagnosis  Acute hypoxic respiratory failure,    COVID-19 pneumonia   Meds reviewed with patient/caregiver?  Yes   Is the patient having any side effects they believe may be caused by any medication additions or changes?  No   Does the patient have all medications ordered at discharge?  Yes   Is the patient taking all medications as directed (includes completed medication regime)?  Yes   Comments regarding appointments  Pt will follow up with pcp this week.   Has the patient kept scheduled appointments due by today?  N/A   Has home health visited the patient within 72 hours of discharge?  N/A   Psychosocial issues?  No   Did the patient receive a copy of their discharge instructions?  Yes   Did the patient receive a copy of COVID-19 specific instructions?  Yes   Nursing interventions  Reviewed instructions with patient   What is the patient's perception of their health status since discharge?  Improving   Pulse Ox monitoring  None   Is the patient/caregiver able to teach back the hierarchy of who to call/visit for symptoms/problems? PCP, Specialist, Home health nurse, Urgent Care, ED, 911  Yes   COVID-19 call completed?  Yes   Wrap up additional comments  Pt feeling well. Pt is not requiring 02 and will follow up with pcp. Pt in no distress.          Khadijah Harris RN

## 2021-09-21 ENCOUNTER — READMISSION MANAGEMENT (OUTPATIENT)
Dept: CALL CENTER | Facility: HOSPITAL | Age: 36
End: 2021-09-21

## 2021-09-21 ENCOUNTER — TELEPHONE (OUTPATIENT)
Dept: FAMILY MEDICINE CLINIC | Facility: CLINIC | Age: 36
End: 2021-09-21

## 2021-09-21 NOTE — OUTREACH NOTE
COVID-19 Week 1 Survey      Responses   [facility] patient discharged from?  [place]   Does the patient have one of the following disease processes/diagnoses(primary or secondary)?  COVID-19   COVID-19 underlying condition?  None   Call Number  Call 3   Week 1 Call successful?  Yes   Call start time  1229   Call end time  1238   Discharge diagnosis  Acute hypoxic respiratory failure,    COVID-19 pneumonia   Meds reviewed with patient/caregiver?  Yes   Is the patient having any side effects they believe may be caused by any medication additions or changes?  No   Does the patient have all medications ordered at discharge?  Yes   Is the patient taking all medications as directed (includes completed medication regime)?  Yes   Does the patient have a primary care provider?   Yes   Does the patient have an appointment with their PCP or specialist within 7 days of discharge?  Greater than 7 days   What is preventing the patient from scheduling follow up appointments within 7 days of discharge?  Earlier appointment not available   Nursing Interventions  Verified appointment date/time/provider [10/1/21 scheduled]   Has the patient kept scheduled appointments due by today?  N/A   Has home health visited the patient within 72 hours of discharge?  N/A   Psychosocial issues?  No   Did the patient receive a copy of their discharge instructions?  Yes   Did the patient receive a copy of COVID-19 specific instructions?  Yes   Nursing interventions  Reviewed instructions with patient   What is the patient's perception of their health status since discharge?  Improving   Does the patient have any of the following symptoms?  Shortness of breath   Nursing Interventions  Nurse provided patient education   Pulse Ox monitoring  Intermittent   Pulse Ox device source  Patient   O2 Sat comments  96% on RA   O2 Sat: education provided  Sat levels, Monitoring frequency, When to seek care   O2 Sat education comments  will go to ED for  O2 sats <90%   Is the patient/caregiver able to teach back steps to recovery at home?  Set small, achievable goals for return to baseline health, Rest and rebuild strength, gradually increase activity, Eat a well-balance diet [using Respirex]   If the patient is a current smoker, are they able to teach back resources for cessation?  Smoking cessation medications [has quit smoking since discharge, uses tobacco substitute, IGNACIA pouches]   Is the patient/caregiver able to teach back the hierarchy of who to call/visit for symptoms/problems? PCP, Specialist, Home health nurse, Urgent Care, ED, 911  Yes   COVID-19 call completed?  Yes          Delmi Barber RN

## 2021-09-21 NOTE — TELEPHONE ENCOUNTER
CALLED PATIENT TO SCHEDULE A NEW PATIENT APT WITH OUR OFFICE, FOR A HOS FU POST COVID. PATIENT ALREADY HAS AN APT SCHEDULED AND DECLINED.    THANKS,  JARAD

## 2021-09-24 ENCOUNTER — READMISSION MANAGEMENT (OUTPATIENT)
Dept: CALL CENTER | Facility: HOSPITAL | Age: 36
End: 2021-09-24

## 2021-09-24 NOTE — OUTREACH NOTE
COVID-19 Week 2 Survey      Responses   [facility], [place], operated by [facility] patient discharged from?  [facility]   Does the patient have one of the following disease processes/diagnoses(primary or secondary)?  COVID-19   COVID-19 underlying condition?  None   Call Number  Call 1   COVID-19 Week 2: Call 1 attempt successful?  Yes   Call start time  1126   Call end time  1132   Discharge diagnosis  Acute hypoxic respiratory failure,    COVID-19 pneumonia   Is patient permission given to speak with other caregiver?  Yes   List who call center can speak with  jamie Crocker other   Person spoke with today (if not patient) and relationship  patient   Is the patient taking all medications as directed (includes completed medication regime)?  Yes   Does the patient have a primary care provider?   Yes   Comments regarding PCP  Anjana FLORES, 10/1/21  930am   Does the patient have an appointment with their PCP or specialist within 7 days of discharge?  Greater than 7 days   Nursing Interventions  Verified appointment date/time/provider   Has the patient kept scheduled appointments due by today?  N/A   Has home health visited the patient within 72 hours of discharge?  N/A   Psychosocial issues?  No   Did the patient receive a copy of their discharge instructions?  Yes   Did the patient receive a copy of COVID-19 specific instructions?  Yes   Nursing interventions  Reviewed instructions with patient   What is the patient's perception of their health status since discharge?  Returned to baseline/stable [Patient reports that he feels back to normal. ]   Does the patient have any of the following symptoms?  None   Pulse Ox monitoring  Intermittent   Pulse Ox device source  Patient   O2 Sat comments  Patient states that he is no longer checking. He denies any respiratory issues now.    Is the patient/caregiver able to teach back steps to recovery at home?  Rest and rebuild strength, gradually increase activity   Is the patient/caregiver  able to teach back the hierarchy of who to call/visit for symptoms/problems? PCP, Specialist, Home health nurse, Urgent Care, ED, 911  Yes   COVID-19 call completed?  Yes   Revoked  No further contact(revokes)-requires comment   Graduated/Revoked comments  Patient reports that he feels back to normal. He reports starting new job on Monday,  declines further calls.    Wrap up additional comments  No further questions or needs.           Sehrry Saha RN

## 2021-10-05 ENCOUNTER — LAB (OUTPATIENT)
Dept: LAB | Facility: HOSPITAL | Age: 36
End: 2021-10-05

## 2021-10-05 ENCOUNTER — OFFICE VISIT (OUTPATIENT)
Dept: FAMILY MEDICINE CLINIC | Facility: CLINIC | Age: 36
End: 2021-10-05

## 2021-10-05 VITALS
HEIGHT: 69 IN | BODY MASS INDEX: 30.81 KG/M2 | OXYGEN SATURATION: 98 % | DIASTOLIC BLOOD PRESSURE: 70 MMHG | HEART RATE: 74 BPM | SYSTOLIC BLOOD PRESSURE: 120 MMHG | WEIGHT: 208 LBS

## 2021-10-05 DIAGNOSIS — R63.1 INCREASED THIRST: ICD-10-CM

## 2021-10-05 DIAGNOSIS — Z83.3 FAMILY HISTORY OF DIABETES MELLITUS (DM): ICD-10-CM

## 2021-10-05 DIAGNOSIS — Z76.89 ENCOUNTER TO ESTABLISH CARE: Primary | ICD-10-CM

## 2021-10-05 DIAGNOSIS — Z76.89 ENCOUNTER TO ESTABLISH CARE: ICD-10-CM

## 2021-10-05 DIAGNOSIS — U09.9 POST-ACUTE COVID-19 SYNDROME: ICD-10-CM

## 2021-10-05 DIAGNOSIS — R06.02 SOB (SHORTNESS OF BREATH) ON EXERTION: ICD-10-CM

## 2021-10-05 PROCEDURE — 83036 HEMOGLOBIN GLYCOSYLATED A1C: CPT

## 2021-10-05 PROCEDURE — 82306 VITAMIN D 25 HYDROXY: CPT

## 2021-10-05 PROCEDURE — 84681 ASSAY OF C-PEPTIDE: CPT

## 2021-10-05 PROCEDURE — 36415 COLL VENOUS BLD VENIPUNCTURE: CPT

## 2021-10-05 PROCEDURE — 99213 OFFICE O/P EST LOW 20 MIN: CPT | Performed by: NURSE PRACTITIONER

## 2021-10-05 PROCEDURE — 80050 GENERAL HEALTH PANEL: CPT

## 2021-10-05 PROCEDURE — 82607 VITAMIN B-12: CPT

## 2021-10-05 RX ORDER — ALBUTEROL SULFATE 90 UG/1
2 AEROSOL, METERED RESPIRATORY (INHALATION)
Status: CANCELLED | OUTPATIENT
Start: 2021-10-05

## 2021-10-05 NOTE — PROGRESS NOTES
Chief Complaint  Establish Care, Transitional Care Management, Blood Sugar Problem, and Sleep Apnea (per fiance)    Subjective          Lior Piper presents to [facility] [place]  establish care. Has been having frequent urination, feels like his sugar may crash, he has a strong family history of diabetes and is concerned he may now have diabetes.He also has been having issues with getting out of breath with any exertion since having Covid. Discharged approximately 2 weeks ago from hospital with Covid.        Shortness of Breath  This is a new problem. The current episode started 1 to 4 weeks ago. The problem occurs daily. The problem has been waxing and waning. Pertinent negatives include no ear pain or wheezing. The symptoms are aggravated by any activity. He has tried steroid inhalers for the symptoms. The treatment provided moderate relief. (COVID)   Blood Sugar Problem  This is a new problem. The current episode started 1 to 4 weeks ago. The problem occurs intermittently. Pertinent negatives include no arthralgias, congestion, coughing, diaphoresis, fatigue, myalgias or nausea. Nothing aggravates the symptoms. He has tried eating for the symptoms.       Review of Systems   Constitutional: Negative for activity change, appetite change, diaphoresis, fatigue and unexpected weight change.   HENT: Negative for congestion and ear pain.    Eyes: Negative for discharge and visual disturbance.   Respiratory: Negative for apnea, cough, choking, shortness of breath, wheezing and stridor.    Gastrointestinal: Negative for abdominal distention, constipation, diarrhea and nausea.   Endocrine: Negative for cold intolerance, heat intolerance, polyphagia and polyuria.   Genitourinary: Negative.  Negative for frequency.   Musculoskeletal: Negative for arthralgias, back pain and myalgias.   Skin: Negative for color change and wound.   Allergic/Immunologic: Negative.   "  Neurological: Negative for dizziness, syncope and light-headedness.   Psychiatric/Behavioral: Negative for agitation and confusion. The patient is not nervous/anxious.          Objective   Vital Signs:   /70 (BP Location: Left arm)   Pulse 74   Ht 175.3 cm (69\")   Wt 94.3 kg (208 lb)   SpO2 98%   BMI 30.72 kg/m²     Physical Exam  Vitals and nursing note reviewed.   Constitutional:       Appearance: Normal appearance. He is well-developed.   HENT:      Head: Normocephalic.      Nose: Nose normal.   Eyes:      Conjunctiva/sclera: Conjunctivae normal.      Pupils: Pupils are equal, round, and reactive to light.   Neck:      Thyroid: No thyroid mass or thyromegaly.      Trachea: Trachea normal. No tracheal tenderness or tracheal deviation.   Cardiovascular:      Rate and Rhythm: Normal rate.      Heart sounds: Normal heart sounds.   Pulmonary:      Effort: Pulmonary effort is normal. No respiratory distress.      Breath sounds: Normal breath sounds. No stridor. No wheezing or rales.   Abdominal:      Palpations: Abdomen is soft.   Musculoskeletal:         General: Normal range of motion.      Cervical back: Normal range of motion.   Lymphadenopathy:      Head:      Right side of head: No submental, submandibular or tonsillar adenopathy.      Left side of head: No submental, submandibular or tonsillar adenopathy.      Cervical: No cervical adenopathy.   Skin:     General: Skin is warm and dry.   Neurological:      Mental Status: He is alert and oriented to person, place, and time.   Psychiatric:         Mood and Affect: Mood is not anxious. Affect is not inappropriate.         Speech: Speech normal.         Behavior: Behavior normal. Behavior is not agitated or hyperactive.         Thought Content: Thought content normal.         Judgment: Judgment normal.        Result Review :                 Assessment and Plan    Diagnoses and all orders for this visit:    1. Encounter to establish care (Primary)  -     " TSH; Future  -     Vitamin D 25 Hydroxy; Future  -     Comprehensive Metabolic Panel; Future  -     Hemoglobin A1c; Future  -     CBC & Differential; Future  -     Vitamin B12; Future  -     C-Peptide; Future    2. Increased thirst  -     TSH; Future  -     Vitamin D 25 Hydroxy; Future  -     Comprehensive Metabolic Panel; Future  -     Hemoglobin A1c; Future  -     CBC & Differential; Future  -     Vitamin B12; Future  -     C-Peptide; Future    3. Family history of diabetes mellitus (DM)  -     TSH; Future  -     Vitamin D 25 Hydroxy; Future  -     Comprehensive Metabolic Panel; Future  -     Hemoglobin A1c; Future  -     CBC & Differential; Future  -     Vitamin B12; Future  -     C-Peptide; Future    4. Post-acute COVID-19 syndrome  -     albuterol (PROAIR RESPICLICK) 108 (90 Base) MCG/ACT inhaler; Inhale 2 puffs Every 6 (Six) Hours As Needed for Wheezing or Shortness of Air.  Dispense: 1 each; Refill: 11    5. SOB (shortness of breath) on exertion  -     albuterol (PROAIR RESPICLICK) 108 (90 Base) MCG/ACT inhaler; Inhale 2 puffs Every 6 (Six) Hours As Needed for Wheezing or Shortness of Air.  Dispense: 1 each; Refill: 11    Other orders  -     Cancel: albuterol sulfate HFA (PROVENTIL HFA;VENTOLIN HFA;PROAIR HFA) inhaler 2 puff          Complete ordered lab work   We will call with results    Start Albuterol inhaler   Please call the office if you have any issues          I spent 36 minutes caring for Lior on this date of service. This time includes time spent by me in the following activities:preparing for the visit, reviewing tests, performing a medically appropriate examination and/or evaluation , counseling and educating the patient/family/caregiver, ordering medications, tests, or procedures and documenting information in the medical record  Follow Up   Return in about 6 months (around 4/5/2022) for Annual physical.  Patient was given instructions and counseling regarding his condition or for health  maintenance advice. Please see specific information pulled into the AVS if appropriate.           This document has been electronically signed by MARK Schmidt on October 5, 2021 10:23 CDT

## 2021-10-06 LAB
25(OH)D3 SERPL-MCNC: 22.7 NG/ML (ref 30–100)
ALBUMIN SERPL-MCNC: 4.4 G/DL (ref 3.5–5.2)
ALBUMIN/GLOB SERPL: 1.8 G/DL
ALP SERPL-CCNC: 61 U/L (ref 39–117)
ALT SERPL W P-5'-P-CCNC: 88 U/L (ref 1–41)
ANION GAP SERPL CALCULATED.3IONS-SCNC: 10.8 MMOL/L (ref 5–15)
AST SERPL-CCNC: 44 U/L (ref 1–40)
BASOPHILS # BLD AUTO: 0.04 10*3/MM3 (ref 0–0.2)
BASOPHILS NFR BLD AUTO: 0.7 % (ref 0–1.5)
BILIRUB SERPL-MCNC: 0.2 MG/DL (ref 0–1.2)
BUN SERPL-MCNC: 15 MG/DL (ref 6–20)
BUN/CREAT SERPL: 14.7 (ref 7–25)
C PEPTIDE SERPL-MCNC: 2.6 NG/ML (ref 1.1–4.4)
CALCIUM SPEC-SCNC: 9.7 MG/DL (ref 8.6–10.5)
CHLORIDE SERPL-SCNC: 104 MMOL/L (ref 98–107)
CO2 SERPL-SCNC: 26.2 MMOL/L (ref 22–29)
CREAT SERPL-MCNC: 1.02 MG/DL (ref 0.76–1.27)
DEPRECATED RDW RBC AUTO: 46.2 FL (ref 37–54)
EOSINOPHIL # BLD AUTO: 0.33 10*3/MM3 (ref 0–0.4)
EOSINOPHIL NFR BLD AUTO: 5.9 % (ref 0.3–6.2)
ERYTHROCYTE [DISTWIDTH] IN BLOOD BY AUTOMATED COUNT: 14.7 % (ref 12.3–15.4)
GFR SERPL CREATININE-BSD FRML MDRD: 83 ML/MIN/1.73
GLOBULIN UR ELPH-MCNC: 2.4 GM/DL
GLUCOSE SERPL-MCNC: 77 MG/DL (ref 65–99)
HBA1C MFR BLD: 6.1 % (ref 4.8–5.6)
HCT VFR BLD AUTO: 39.3 % (ref 37.5–51)
HGB BLD-MCNC: 13 G/DL (ref 13–17.7)
IMM GRANULOCYTES # BLD AUTO: 0.04 10*3/MM3 (ref 0–0.05)
IMM GRANULOCYTES NFR BLD AUTO: 0.7 % (ref 0–0.5)
LYMPHOCYTES # BLD AUTO: 1.38 10*3/MM3 (ref 0.7–3.1)
LYMPHOCYTES NFR BLD AUTO: 24.7 % (ref 19.6–45.3)
MCH RBC QN AUTO: 29 PG (ref 26.6–33)
MCHC RBC AUTO-ENTMCNC: 33.1 G/DL (ref 31.5–35.7)
MCV RBC AUTO: 87.7 FL (ref 79–97)
MONOCYTES # BLD AUTO: 0.35 10*3/MM3 (ref 0.1–0.9)
MONOCYTES NFR BLD AUTO: 6.3 % (ref 5–12)
NEUTROPHILS NFR BLD AUTO: 3.45 10*3/MM3 (ref 1.7–7)
NEUTROPHILS NFR BLD AUTO: 61.7 % (ref 42.7–76)
NRBC BLD AUTO-RTO: 0 /100 WBC (ref 0–0.2)
PLATELET # BLD AUTO: 170 10*3/MM3 (ref 140–450)
PMV BLD AUTO: 10.6 FL (ref 6–12)
POTASSIUM SERPL-SCNC: 4.4 MMOL/L (ref 3.5–5.2)
PROT SERPL-MCNC: 6.8 G/DL (ref 6–8.5)
RBC # BLD AUTO: 4.48 10*6/MM3 (ref 4.14–5.8)
SODIUM SERPL-SCNC: 141 MMOL/L (ref 136–145)
TSH SERPL DL<=0.05 MIU/L-ACNC: 1.97 UIU/ML (ref 0.27–4.2)
VIT B12 BLD-MCNC: 533 PG/ML (ref 211–946)
WBC # BLD AUTO: 5.59 10*3/MM3 (ref 3.4–10.8)

## 2021-10-14 DIAGNOSIS — R53.83 FATIGUE, UNSPECIFIED TYPE: Primary | ICD-10-CM

## 2021-10-14 DIAGNOSIS — R06.83 SNORING: ICD-10-CM

## 2021-10-14 DIAGNOSIS — G47.8 NON-RESTORATIVE SLEEP: ICD-10-CM

## 2021-10-21 RX ORDER — ALBUTEROL SULFATE 90 UG/1
2 AEROSOL, METERED RESPIRATORY (INHALATION) EVERY 6 HOURS PRN
Qty: 18 G | Refills: 11 | Status: SHIPPED | OUTPATIENT
Start: 2021-10-21 | End: 2022-03-16

## 2021-11-16 ENCOUNTER — OFFICE VISIT (OUTPATIENT)
Dept: SLEEP MEDICINE | Facility: HOSPITAL | Age: 36
End: 2021-11-16

## 2021-11-16 VITALS
WEIGHT: 219.7 LBS | DIASTOLIC BLOOD PRESSURE: 90 MMHG | SYSTOLIC BLOOD PRESSURE: 137 MMHG | BODY MASS INDEX: 32.54 KG/M2 | HEIGHT: 69 IN | OXYGEN SATURATION: 99 % | HEART RATE: 79 BPM

## 2021-11-16 DIAGNOSIS — G47.10 HYPERSOMNIA WITH SLEEP APNEA: ICD-10-CM

## 2021-11-16 DIAGNOSIS — R06.89 GASPING FOR BREATH: ICD-10-CM

## 2021-11-16 DIAGNOSIS — R06.81 WITNESSED EPISODE OF APNEA: Primary | ICD-10-CM

## 2021-11-16 DIAGNOSIS — G47.30 HYPERSOMNIA WITH SLEEP APNEA: ICD-10-CM

## 2021-11-16 DIAGNOSIS — R06.83 SNORES: ICD-10-CM

## 2021-11-16 PROCEDURE — 99203 OFFICE O/P NEW LOW 30 MIN: CPT | Performed by: INTERNAL MEDICINE

## 2021-11-16 NOTE — PROGRESS NOTES
"      [facility]  [address] [place] [address] Drive  [place], [place] [address]  Phone: [phone]  Fax: [phone]      New Patient Sleep Medicine Consultation  Sleep Visit Only    Encounter Date: 11/16/2021         Patient's PCP: Anjana Camargo APRN  Referring provider: Anjana Camargo APRN  Reason for consultation chief complaint: snoring, excessive daytime sleepiness and unrefreshing sleep    CHIEF COMPLAINT:  :   Chief Complaint   Patient presents with   • Fatigue     neck---16\"   • Leg/body Jerks During Sleep   • Dry Mouth   • Snoring   • Frequent Awakenings   • Nocturia   • Witnessed Apnea        Encounter Date: 11/16/2021           HISTORY OF PRESENT ILLNESS:  Referred by Provider for Sleep Evaluation for Sleep Apnea  36 years old Patient S/P Covid infection,  2 months ago with 2 weeks Hospitalization, patient was referred by PCP due to weakness apnea and disruptive snoring.      Lior Piper is a 36 y.o. male whose bedtime is ~ 8-8:30 am. He  falls asleep after 15-30 minutes, and is up 2 times per night. He wakes up ~ 5:00 am. He endorses 7-8 hours of sleep. He drinks 2-3 cups of coffee, No teas, and 1 sodas per day, He drinks one energy drink a day, substitues for coffee. He drinks 4 alcoholic beverages per week.    Lior Piper admits to snoring, unrestful sleep, gasping during sleep, excessive daytime sleepiness, morning headaches, stop breathing during sleep, frequent unexplained arousals, Disturbed or restless sleep, choking duing sleep, Up to the bathroom at night, night sweats and restless legs at night. He denies cataplexy, sleep paralysis, or hypnagogic hallucinations. He does not take sedatives or hypnotics. He has No sleepiness with driving. He naps when off, 1-2, 2-4 hours. He watches TV before falling to asleep.He has gained 20 pounds last 12 months. He does not exercise regularly.    Family History ANGELA: Aunt    Brief Social History:  He is a never smoker. " "He dips Tobacco, He used to smoke before COVID infection, He smoked for 20 years, 1 PPD, Quit 2 months ago 09/2021  Nicotine vaping: No  No POT smoker: Sometimes    Occupation:  Office Work    PAST MEDICAL AND SURGICAL HISTORIES:  S/P COVID 19 Infection 09/2021  Denied Significant PMH and PSH      Allergies   Allergen Reactions   • Chocolate Other (See Comments)         Family History   Problem Relation Age of Onset   • Hypertension Mother    • COPD Mother    • Hypertension Father    • Diabetes Brother    • Diabetes Maternal Grandmother    • Asthma Paternal Grandmother    • Diabetes Paternal Grandmother      Social History     Socioeconomic History   • Marital status:    Tobacco Use   • Smoking status: Never Smoker   • Smokeless tobacco: Current User     Types: Chew   Substance and Sexual Activity   • Alcohol use: Yes     Alcohol/week: 1.0 standard drink     Types: 1 Shots of liquor per week     Comment: \"one coctail a day\"   • Drug use: Defer   • Sexual activity: Defer     MEDICATIONS:    Current Outpatient Medications:   •  albuterol sulfate  (90 Base) MCG/ACT inhaler, Inhale 2 puffs Every 6 (Six) Hours As Needed for Wheezing., Disp: 18 g, Rfl: 11      Review of Systems   Constitutional: Positive for fatigue.   HENT: Negative for congestion.    Eyes: Negative for pain.   Respiratory: Negative for shortness of breath ([place]+) and wheezing.    Cardiovascular: Negative for chest pain.   Gastrointestinal: Negative for blood in stool.   Endocrine: Negative for polydipsia.   Musculoskeletal: Negative for arthralgias and myalgias.   Skin: Negative for pallor.   Allergic/Immunologic: Negative for food allergies.   Neurological: Negative for syncope.   Hematological: Does not bruise/bleed easily.   Psychiatric/Behavioral: Positive for sleep disturbance.     OBJECTIVE:    Vitals:    11/16/21 0808   BP: 137/90   Pulse: 79   SpO2: 99%         11/16/21  0808   Weight: 99.7 kg (219 lb 11.2 oz)     Body mass index " "is 32.43 kg/m². Patient's Body mass index is 32.43 kg/m².    Neck circumference: 16\"  ESS: 9    PHYSICAL EXAM:          General: Alert. Cooperative. No acute distress.             Head:  Normocephalic. Symmetrical. Atraumatic.              Eyes: Sclera clear. No icterus.              Nose: Mild septal deviation. No drainage. No nasal Polyps, No Skin Ulcers             Neck:  Trachea midline, No Stridor, No Supraclavicular Adenopathy          Throat: No oral lesions. No thrush. Moist mucous membranes.    Tongue is normal    Dentition is good       Pharynx: Posterior pharyngeal pillars are narrow    Mallampati score of II (hard and soft palate, upper portion of tonsils anduvula visible)    Pharynx is nonerythematous                     Lungs:  Clear to auscultation bilaterally. No wheezes. No rhonchi. No rales. Respirations regular, even and unlabored.            Heart:  Regular rhythm and normal rate. Normal S1 and S2. No murmur.     Abdomen:  Soft, non-tender and non-distended. Normal bowel sounds.  Extremities:  No edema, no Clubbing, No Cyanosis, upper extremity pulses palpable              Skin: No jaundice or rash, no nasal bridge ulceration           Neuro: Moves all 4 extremities  Psychiatric: Normal mood and affect.      IMPRESSION AND RECOMMENDATIONS:  Pleasant 36-year-old gentleman referred by PCP due to witnessed apnea, hypersomnia, disruptive snoring patient with an [place] Sleepiness Scale of 9 with a neck circumference 16 inches with has a history of smoking for 20 years quitting 2 months ago in 2021 once he got Covid pneumonia infection.  Patient drinks coffee and sometimes substitutes the coffee with energy drink.    1. Witnessed episode of apnea  - We will get a home sleep test and if it is nondiagnostic we will proceed with diagnostic PSG  - If home sleep testing is diagnostic for ANGELA we will place the patient on AutoPap  - Home Sleep Study; Future    Printed material regarding ANGELA facts has been " provided to the patient  Sleep apnea implications such as including but not limited to: Uncontrolled hypertension, sudden death, massive heart attack, stroke risk, sleep deprived motor vehicle accident, even death has been discussed and patient verbalized understanding.  Patient advised not to drive or operate heavy machinery when feeling sleepy    2. Gasping for breath-Nocturnal Gasping  - Home Sleep Study; Future    3. Snores  - Home Sleep Study; Future    4. Hypersomnia with sleep apnea-Suspected  - Home Sleep Study; Future        Contraindications to home sleep test: none    FOLLOW UP:  Return in about 3 weeks (around 12/7/2021) for Follow up after study.     Thank you for letting us to participate in the care of your patient.      Mihir Cannon MD, FACP, FCCP  Diplomate in Pulmonary & Sleep Medicine    This document has been electronically signed by Mihir Cannon MD on November 16, 2021 08:53 CST      Instructions provided as documented in the After Visit Summary.  The patient indicated understanding of the diagnosis and agreed with the plan of care    EMR Dragon/Transcription disclaimer:   Some of this note may be an electronic transcription/translation of spoken language to printed text. The electronic translation of spoken language may permit erroneous, or at times, nonsensical words or phrases to be inadvertently transcribed; Although I have reviewed the note for such errors, some may still exist.      CC: Anjana Camargo APRN Utley, Ashley, APRN

## 2021-11-18 ENCOUNTER — HOSPITAL ENCOUNTER (OUTPATIENT)
Dept: SLEEP MEDICINE | Facility: HOSPITAL | Age: 36
Discharge: HOME | End: 2021-11-18
Admitting: INTERNAL MEDICINE

## 2021-11-18 ENCOUNTER — APPOINTMENT (OUTPATIENT)
Dept: SLEEP MEDICINE | Facility: HOSPITAL | Age: 36
End: 2021-11-18

## 2021-11-18 DIAGNOSIS — R06.81 WITNESSED EPISODE OF APNEA: ICD-10-CM

## 2021-11-18 DIAGNOSIS — G47.30 HYPERSOMNIA WITH SLEEP APNEA: ICD-10-CM

## 2021-11-18 DIAGNOSIS — R06.89 GASPING FOR BREATH: ICD-10-CM

## 2021-11-18 DIAGNOSIS — G47.10 HYPERSOMNIA WITH SLEEP APNEA: ICD-10-CM

## 2021-11-18 DIAGNOSIS — R06.83 SNORES: ICD-10-CM

## 2021-11-18 PROCEDURE — 95800 SLP STDY UNATTENDED: CPT | Performed by: INTERNAL MEDICINE

## 2021-11-18 PROCEDURE — 95800 SLP STDY UNATTENDED: CPT

## 2021-12-02 ENCOUNTER — OFFICE VISIT (OUTPATIENT)
Dept: SLEEP MEDICINE | Facility: HOSPITAL | Age: 36
End: 2021-12-02

## 2021-12-02 VITALS
WEIGHT: 219.4 LBS | SYSTOLIC BLOOD PRESSURE: 140 MMHG | BODY MASS INDEX: 32.5 KG/M2 | OXYGEN SATURATION: 98 % | HEIGHT: 69 IN | DIASTOLIC BLOOD PRESSURE: 91 MMHG | HEART RATE: 85 BPM

## 2021-12-02 DIAGNOSIS — G47.33 OBSTRUCTIVE SLEEP APNEA, ADULT: Primary | ICD-10-CM

## 2021-12-02 PROCEDURE — 99212 OFFICE O/P EST SF 10 MIN: CPT | Performed by: NURSE PRACTITIONER

## 2021-12-02 NOTE — PATIENT INSTRUCTIONS
Obesity, Adult  Obesity is the condition of having too much total body fat. Being overweight or obese means that your weight is greater than what is considered healthy for your body size. Obesity is determined by a measurement called BMI. BMI is an estimate of body fat and is calculated from height and weight. For adults, a BMI of 30 or higher is considered obese.  Obesity can lead to other health concerns and major illnesses, including:  · Stroke.  · Coronary artery disease (CAD).  · Type 2 diabetes.  · Some types of cancer, including cancers of the colon, breast, uterus, and gallbladder.  · Osteoarthritis.  · High blood pressure (hypertension).  · High cholesterol.  · Sleep apnea.  · Gallbladder stones.  · Infertility problems.  What are the causes?  Common causes of this condition include:  · Eating daily meals that are high in calories, sugar, and fat.  · Being born with genes that may make you more likely to become obese.  · Having a medical condition that causes obesity, including:  ? Hypothyroidism.  ? Polycystic ovarian syndrome (PCOS).  ? Binge-eating disorder.  ? Cushing syndrome.  · Taking certain medicines, such as steroids, antidepressants, and seizure medicines.  · Not being physically active (sedentary lifestyle).  · Not getting enough sleep.  · Drinking high amounts of sugar-sweetened beverages, such as soft drinks.  What increases the risk?  The following factors may make you more likely to develop this condition:  · Having a family history of obesity.  · Being a woman of  descent.  · Being a man of  descent.  · Living in an area with limited access to:  ? [place], recreation centers, or sidewalks.  ? Healthy food choices, such as grocery stores and farmers' markets.  What are the signs or symptoms?  The main sign of this condition is having too much body fat.  How is this diagnosed?  This condition is diagnosed based on:  · Your BMI. If you are an adult with a BMI of 30 or  higher, you are considered obese.  · Your waist circumference. This measures the distance around your waistline.  · Your skinfold thickness. Your health care provider may gently pinch a fold of your skin and measure it.  You may have other tests to check for underlying conditions.  How is this treated?  Treatment for this condition often includes changing your lifestyle. Treatment may include some or all of the following:  · Dietary changes. This may include developing a healthy meal plan.  · Regular physical activity. This may include activity that causes your heart to beat faster (aerobic exercise) and strength training. Work with your health care provider to design an exercise program that works for you.  · Medicine to help you lose weight if you are unable to lose 1 pound a week after 6 weeks of healthy eating and more physical activity.  · Treating conditions that cause the obesity (underlying conditions).  · Surgery. Surgical options may include gastric banding and gastric bypass. Surgery may be done if:  ? Other treatments have not helped to improve your condition.  ? You have a BMI of 40 or higher.  ? You have life-threatening health problems related to obesity.  Follow these instructions at home:  Eating and drinking    · Follow recommendations from your health care provider about what you eat and drink. Your health care provider may advise you to:  ? Limit fast food, sweets, and processed snack foods.  ? Choose low-fat options, such as low-fat milk instead of whole milk.  ? Eat 5 or more servings of fruits or vegetables every day.  ? Eat at home more often. This gives you more control over what you eat.  ? Choose healthy foods when you eat out.  ? Learn to read food labels. This will help you understand how much food is considered 1 serving.  ? Learn what a healthy serving size is.  ? Keep low-fat snacks available.  ? Limit sugary drinks, such as soda, fruit juice, sweetened iced tea, and flavored  milk.  · Drink enough water to keep your urine pale yellow.  · Do not follow a fad diet. Fad diets can be unhealthy and even dangerous.    Physical activity  · Exercise regularly, as told by your health care provider.  ? Most adults should get up to 150 minutes of moderate-intensity exercise every week.  ? Ask your health care provider what types of exercise are safe for you and how often you should exercise.  · Warm up and stretch before being active.  · Cool down and stretch after being active.  · Rest between periods of activity.  Lifestyle  · Work with your health care provider and a dietitian to set a weight-loss goal that is healthy and reasonable for you.  · Limit your screen time.  · Find ways to reward yourself that do not involve food.  · Do not drink alcohol if:  ? Your health care provider tells you not to drink.  ? You are pregnant, may be pregnant, or are planning to become pregnant.  · If you drink alcohol:  ? Limit how much you use to:  § 0-1 drink a day for women.  § 0-2 drinks a day for men.  ? Be aware of how much alcohol is in your drink. In the [place] one drink equals one 12 oz bottle of beer (355 mL), one 5 oz glass of wine (148 mL), or one 1½ oz glass of hard liquor (44 mL).  General instructions  · Keep a weight-loss journal to keep track of the food you eat and how much exercise you get.  · Take over-the-counter and prescription medicines only as told by your health care provider.  · Take vitamins and supplements only as told by your health care provider.  · Consider joining a support group. Your health care provider may be able to recommend a support group.  · Keep all follow-up visits as told by your health care provider. This is important.  Contact a health care provider if:  · You are unable to meet your weight loss goal after 6 weeks of dietary and lifestyle changes.  Get help right away if you are having:  · Trouble breathing.  · Suicidal thoughts or behaviors.  Summary  · Obesity is  the condition of having too much total body fat.  · Being overweight or obese means that your weight is greater than what is considered healthy for your body size.  · Work with your health care provider and a dietitian to set a weight-loss goal that is healthy and reasonable for you.  · Exercise regularly, as told by your health care provider. Ask your health care provider what types of exercise are safe for you and how often you should exercise.  This information is not intended to replace advice given to you by your health care provider. Make sure you discuss any questions you have with your health care provider.  Document Revised: 08/22/2019 Document Reviewed: 08/22/2019  Elsevier Patient Education © 2021 [employer]

## 2021-12-02 NOTE — PROGRESS NOTES
Sleep Clinic Follow-Up    CHIEF COMPLAINT: follow up sleep testing    LAST OV: 11/16/2021 (I reviewed this note)    HPI:  The patient is a 36 y.o. male.  I discussed the results of the recent home sleep study performed on 11/18/2021. Presumed sleep time 6 hrs 44 minutes. The AHI/MACHELLE was 9.1. Mean O2 was 95% with a devante of 86%. Snoring present. Mean heart rate 67 BPM.       INTERVAL MEDICAL HISTORY: No change since last office visit in regard to the patient's bedtime routine, medications, or diagnosis.    PAP Data:  Currently not on therapy   Mask type: mask fitting per DME  D[facility]: [facility]       Bed time: 5959-6861  Sleep latency: 15-30 minutes  Number of times awakens during the night: 2  Wake time: 0500  Estimated total sleep time at night: 7-8 hours  Caffeine intake: 2-3 coffee, 1 soda  Alcohol intake: 4  Nap time: some days    Sleepiness with Driving: denies       MEDICATIONS:   Current Outpatient Medications:   •  albuterol sulfate  (90 Base) MCG/ACT inhaler, Inhale 2 puffs Every 6 (Six) Hours As Needed for Wheezing., Disp: 18 g, Rfl: 11    PHYSICAL EXAM  Vitals:    12/02/21 1625   BP: 140/91   Pulse: 85   SpO2: 98%           12/02/21  1625   Weight: 99.5 kg (219 lb 6.4 oz)       Body mass index is 32.38 kg/m². Patient's Body mass index is 32.38 kg/m². indicating that he is obese (BMI >30). Obesity-related health conditions include the following: obstructive sleep apnea. Obesity is unchanged. BMI is is above average; BMI management plan is completed. We discussed portion control and increasing exercise..      Gen:  No acute distress heard in voice, alert, oriented  Eyes:    Moist conjunctiva, EOMI   Lungs:  Normal effort, non-labored breathing  Heart:  No lower extremity edema   Psych:  Appropriate affect   Neuro:  Cn2-12 appear intact     Assessment and Plan:    1. Obstructive sleep apnea -   1. Start on auto CPAP 5-15 cm H2O with mask fitting per DME and PAP supplies   2. DME: [facility]   3. Continue PAP as  prescribed.   4. Monitor compliance report   5. Drowsy driving tips- do not drive if feeling sleepy   6. Return to clinic in 12 weeks with compliance report, or sooner if needed       The sleep results were discussed in detail with the patient.  The risks of untreated sleep apnea were reviewed.  Treatment options for sleep apnea were discussed in detail. He wants to pursue PAP therapy. I counseled patient on PAP maintenance, management, compliance, sleep hygiene, including regular sleep wake schedule and stimulus control therapy, the importance of weight reduction, safe driving and abstaining from smoking and alcohol consumption, as well as other sedatives.       All of the patient's questions were answered. He states understanding and agreement with my assessment and plan as above.     I obtained a brief history from the patient, reviewed the medical problems and current medications, and made medical decisions regarding treatment based on that information. Total visit time 15 min, with total of 9 minutes spent counseling patient regarding: PAP therapy, PAP compliance, PAP maintenance, Weight loss, Lifestyle modifications, Sleep hygiene and Study results.       Patient to follow up in 31-90 days with compliance report   He agrees to return sooner on a prn basis if sx change.           This document has been electronically signed by MARK Roper on December 2, 2021 16:27 CST            CC: Anjana Camargo APRN          No ref. provider found

## 2022-03-16 ENCOUNTER — OFFICE VISIT (OUTPATIENT)
Dept: SLEEP MEDICINE | Facility: HOSPITAL | Age: 37
End: 2022-03-16

## 2022-03-16 VITALS
HEIGHT: 69 IN | HEART RATE: 61 BPM | OXYGEN SATURATION: 99 % | WEIGHT: 231.4 LBS | SYSTOLIC BLOOD PRESSURE: 124 MMHG | BODY MASS INDEX: 34.27 KG/M2 | DIASTOLIC BLOOD PRESSURE: 79 MMHG

## 2022-03-16 DIAGNOSIS — Z78.9 DIFFICULTY WITH CPAP FULL FACE MASK USE: ICD-10-CM

## 2022-03-16 DIAGNOSIS — G47.33 OBSTRUCTIVE SLEEP APNEA, ADULT: Primary | ICD-10-CM

## 2022-03-16 PROCEDURE — 99212 OFFICE O/P EST SF 10 MIN: CPT | Performed by: NURSE PRACTITIONER

## 2022-03-16 NOTE — PROGRESS NOTES
Sleep Clinic Follow Up    Date: 3/16/2022  Primary Care Provider: Anjana Camargo APRN    Last office visit: 2021 (I reviewed this note)    CC: Follow up: ANGELA started on CPAP, 31-90 day follow-up       Interim History:  Since the last visit:    1) mild ANGELA -  Lior Piper has not remained compliant with CPAP. He denies  machine issues, dry mouth, headaches, or pressures intolerance. He denies abnormal dreams, sleep paralysis, nasal congestion, URI sx.    Since starting on CPAP he reports less daytime sleepiness and more refreshing sleep. He forgets to put mask on and falls asleep. He does not like mask he is using. Dreamwear FFM. States irritates his face. Wants to try different mask.     2) Patient denies RLS symptoms.       Sleep Testin. HST on 2021, AHI of 9   2. Currently on 5-15 cm H2O    PAP Data:    Time frame: 2021-03/15/2022   Compliance: 56 %  Average use on days used: 5hrs 31 min  Percent of days with usage greater than or equal to 4 hours: 38%  PAP range: 5-15 cm H2O  Average 90% pressure: 11.4 cmH2O  Leak: 35.7 minutes  Average AHI: 2.6 events/hr  Mask type: Full face mask  DME: [facility]     Bed time: 2200  Sleep latency: 30 minutes  Number of times awakens during the night: 1  Wake time: 1130-1613  Estimated total sleep time at night: 7-8 hours  Caffeine intake: 2 cups of coffee, 0 cups of tea, and 1-2 sodas per day  Alcohol intake: 0 drinks per week  Nap time: denies    Sleepiness with Driving: denies      [place] - 2, was 9 prior to CPAP        PMHx, FH, SH reviewed and pertinent changes are:  unchanged from last office visit on 2021      REVIEW OF SYSTEMS:   Negative for chest pain, SOA, fever, chills, cough, N/V/D, abdominal pain.    Smoking:none           Exam:  Vitals:    22 0806   BP: 124/79   Pulse: 61   SpO2: 99%           22  0806   Weight: 105 kg (231 lb 6.4 oz)     Body mass index is 34.16 kg/m². Patient's Body mass index is 34.16 kg/m².  indicating that he is obese (BMI >30). Obesity-related health conditions include the following: obstructive sleep apnea. Obesity is unchanged. BMI is is above average; BMI management plan is completed. We discussed portion control and increasing exercise..      Gen:                No distress, conversant, pleasant, appears stated age, alert, oriented  Eyes:               Anicteric sclera, moist conjunctiva, no lid lag                           EOMI   Lungs:             normal effort, non-labored breathing                          Clear to auscultation bilaterally          CV:                  Normal S1/S2, no murmur                          no lower extremity edema                 Psych:             Appropriate affect  Neuro:             CN 2-12 appear intact    No past medical history on file.  No current outpatient medications on file.      Assessment and Plan:    1. Obstructive sleep apnea  -Established, stable (1)  1. Not compliant with PAP therapy- encouraged increased compliance. Health impact of sleep apnea. Possibility of repossession of machine by DME  2. Continue PAP as prescribed  3. Call [facility] to make appointment for mask fitting   4. Script for PAP supplies  5. Drowsy driving tips- do not drive if feeling sleepy   6. Return to clinic in 6 weeks with compliance report unless change in symptoms in interim period          I spent 15 minutes caring for Lior on this date of service. This time includes time spent by me in the following activities: preparing for the visit, obtaining and/or reviewing a separately obtained history, performing a medically appropriate examination and/or evaluation, counseling and educating the patient/family/caregiver, ordering medications, tests, or procedures and documenting information in the medical record.           This document has been electronically signed by MARK Roper on March 16, 2022 08:09 CDT            CC: Anjana Camargo APRN          No ref. provider  found

## 2022-05-05 ENCOUNTER — OFFICE VISIT (OUTPATIENT)
Dept: SLEEP MEDICINE | Facility: HOSPITAL | Age: 37
End: 2022-05-05

## 2022-05-05 VITALS
WEIGHT: 232.7 LBS | OXYGEN SATURATION: 98 % | DIASTOLIC BLOOD PRESSURE: 86 MMHG | BODY MASS INDEX: 34.47 KG/M2 | HEIGHT: 69 IN | SYSTOLIC BLOOD PRESSURE: 137 MMHG | HEART RATE: 66 BPM

## 2022-05-05 DIAGNOSIS — G47.33 OBSTRUCTIVE SLEEP APNEA, ADULT: Primary | ICD-10-CM

## 2022-05-05 PROCEDURE — 99212 OFFICE O/P EST SF 10 MIN: CPT | Performed by: NURSE PRACTITIONER

## 2022-05-05 NOTE — PROGRESS NOTES
Sleep Clinic Follow Up    Date: 2022  Primary Care Provider: Anjana Camargo APRN    Last office visit: 2022 (I reviewed this note)    CC: Follow up: ANGELA on CPAP compliance check       Interim History:  Since the last visit:    1) mild ANGELA -  Lior Piper has mostly remained compliant with CPAP. He denies mask and machine issues, dry mouth, headaches, or pressures intolerance. He denies abnormal dreams, sleep paralysis, nasal congestion, URI sx.    Since last OV compliance has improved. Has new FFM and he likes much better. Most mornings he wakes up and mask is still on face. Still has nights where he falls asleep and forgets to use.   Still benefiting from usage. Feels rested when he wakes up in the mornings and less tired during the day.       2) Patient denies RLS symptoms.       Sleep Testin. HST on 2021, AHI of 9   2. Currently on 5-15 cm H2O    PAP Data:    Time frame: 2022-2022   Compliance: 83 %  Average use on days used: 5hrs 50 min  Percent of days with usage greater than or equal to 4 hours: 63%  PAP range: 5-15 cm H2O  Average 90% pressure: 11.5 cmH2O  Leak: 30.5 L/ minutes  Average AHI: 2.5 events/hr  Mask type: Full face mask  DME: [facility]     Bed time: 2100  Sleep latency: 20 minutes  Number of times awakens during the night: 0-1  Wake time: 0500  Estimated total sleep time at night: 7 hours  Caffeine intake: 1-2 cups of coffee, 0 cups of tea, and 1 sodas per day  Alcohol intake: 0 drinks per week  Nap time: denies    Sleepiness with Driving: denies      [place] - 2        PMHx, FH, SH reviewed and pertinent changes are:  unchanged from last office visit on 2022      REVIEW OF SYSTEMS:   Negative for chest pain, SOA, fever, chills, cough, N/V/D, abdominal pain.    Smoking:none         Exam:  Vitals:    22 0831   BP: 137/86   Pulse: 66   SpO2: 98%           22  0831   Weight: 106 kg (232 lb 11.2 oz)     Body mass index is 34.35 kg/m². BMI  is >= 30 and <= 34.9 (Class 1 obesity). The following options were offered after discussion: nutrition counseling/recommendations      Gen:                No distress, conversant, pleasant, appears stated age, alert, oriented  Eyes:               Anicteric sclera, moist conjunctiva, no lid lag                           EOMI   Lungs:             normal effort, non-labored breathing                          Clear to auscultation bilaterally          CV:                  Normal S1/S2, no murmur                          no lower extremity edema                 Psych:             Appropriate affect  Neuro:             CN 2-12 appear intact    No past medical history on file.  No current outpatient medications on file.      Assessment and Plan:    1. Obstructive sleep apnea    1. Mostly compliant with PAP therapy- again encouraged increased compliance. Happy with compliance increase since last OV  2. Continue PAP as prescribed  3. Check compliance in 30 days and fax to DME  4. Script for PAP supplies  5. Drowsy driving tips- do not drive if feeling sleepy   6. Return to clinic in 4 months with compliance report unless change in symptoms in interim period          I spent 15 minutes caring for Lior on this date of service. This time includes time spent by me in the following activities: preparing for the visit, obtaining and/or reviewing a separately obtained history, performing a medically appropriate examination and/or evaluation, counseling and educating the patient/family/caregiver, ordering medications, tests, or procedures and documenting information in the medical record.           This document has been electronically signed by MARK Roper on May 5, 2022 08:32 CDT            CC: Anjana Camargo APRN          No ref. provider found

## 2022-06-06 ENCOUNTER — DOCUMENTATION (OUTPATIENT)
Dept: SLEEP MEDICINE | Facility: HOSPITAL | Age: 37
End: 2022-06-06

## 2022-06-06 NOTE — PROGRESS NOTES
PAP Data:    Time frame: 05/07/2022-06/05/2022   Compliance 83 %  Average use on days used: 6hrs 11 min  Percent of days with usage greater than or equal to 4 hours: 63%  PAP range : 5-15 cm H2O  Average 90% pressure: 11 cmH2O  Leak: 28 L/ minutes  Average AHI 1 events/hr  DME: [facility]     
yes

## 2023-01-11 ENCOUNTER — OFFICE VISIT (OUTPATIENT)
Dept: FAMILY MEDICINE CLINIC | Facility: CLINIC | Age: 38
End: 2023-01-11
Payer: COMMERCIAL

## 2023-01-11 VITALS
TEMPERATURE: 97.1 F | HEIGHT: 69 IN | WEIGHT: 220.5 LBS | SYSTOLIC BLOOD PRESSURE: 128 MMHG | OXYGEN SATURATION: 98 % | HEART RATE: 76 BPM | BODY MASS INDEX: 32.66 KG/M2 | RESPIRATION RATE: 18 BRPM | DIASTOLIC BLOOD PRESSURE: 72 MMHG

## 2023-01-11 DIAGNOSIS — R73.03 PRE-DIABETES: ICD-10-CM

## 2023-01-11 DIAGNOSIS — R73.09 ELEVATED HEMOGLOBIN A1C: Primary | ICD-10-CM

## 2023-01-11 PROCEDURE — 99214 OFFICE O/P EST MOD 30 MIN: CPT | Performed by: NURSE PRACTITIONER

## 2023-01-11 NOTE — PROGRESS NOTES
"Chief Complaint  weightloss and Follow-up    Subjective          Lior Piper presents to [facility] [place] with concerns of weight gain. He would like to discuss options for weight loss. He would like to avoid turning into a diabetic.           Blood Sugar Problem  This is a chronic problem. The current episode started more than 1 month ago. The problem occurs intermittently. Associated symptoms include fatigue. Pertinent negatives include no arthralgias, congestion, coughing, diaphoresis, myalgias or nausea. Nothing aggravates the symptoms. He has tried eating for the symptoms.   Obesity  This is a recurrent problem. The current episode started more than 1 month ago. The problem occurs constantly. The problem has been waxing and waning. Associated symptoms include fatigue. Pertinent negatives include no arthralgias, congestion, coughing, diaphoresis, myalgias or nausea. The symptoms are aggravated by eating and drinking. He has tried walking for the symptoms. The treatment provided no relief.     No outpatient medications prior to visit.     No facility-administered medications prior to visit.       Review of Systems   Constitutional: Positive for fatigue. Negative for diaphoresis.   HENT: Negative for congestion.    Respiratory: Negative for cough.    Gastrointestinal: Negative for nausea.   Musculoskeletal: Negative for arthralgias and myalgias.         Objective   Vital Signs:   Visit Vitals  /72   Pulse 76   Temp 97.1 °F (36.2 °C) (Infrared)   Resp 18   Ht 175.3 cm (69.02\")   Wt 100 kg (220 lb 8 oz)   SpO2 98%   BMI 32.54 kg/m²     Physical Exam  Vitals and nursing note reviewed.   Constitutional:       Appearance: Normal appearance. He is well-developed. He is obese.   HENT:      Head: Normocephalic and atraumatic.      Right Ear: Hearing normal.      Left Ear: Hearing normal.      Nose: Nose normal. No mucosal edema or rhinorrhea.   Eyes:     "  General: Lids are normal.      Conjunctiva/sclera: Conjunctivae normal.      Pupils: Pupils are equal, round, and reactive to light.   Neck:      Thyroid: No thyroid mass or thyromegaly.      Trachea: Trachea normal. No tracheal tenderness or tracheal deviation.   Cardiovascular:      Rate and Rhythm: Normal rate.      Pulses: Normal pulses.      Heart sounds: Normal heart sounds.   Pulmonary:      Effort: Pulmonary effort is normal. No respiratory distress.      Breath sounds: Normal breath sounds. No stridor. No wheezing or rales.   Abdominal:      Palpations: Abdomen is soft.   Musculoskeletal:         General: Normal range of motion.      Cervical back: Normal range of motion.   Skin:     General: Skin is warm and dry.   Neurological:      Mental Status: He is alert and oriented to person, place, and time.   Psychiatric:         Mood and Affect: Mood is not anxious. Affect is not inappropriate.         Speech: Speech normal.         Behavior: Behavior normal. Behavior is not agitated or hyperactive.         Thought Content: Thought content normal.         Judgment: Judgment normal.        Result Review :                 Assessment and Plan    Diagnoses and all orders for this visit:    1. Elevated hemoglobin A1c (Primary)  -     Semaglutide,0.25 or 0.5MG/DOS, (OZEMPIC) 2 MG/1.5ML solution pen-injector; Inject 0.25 mg under the skin into the appropriate area as directed 1 (One) Time Per Week.  Dispense: 1.5 mL; Refill: 3  -     TSH; Future  -     Comprehensive Metabolic Panel; Future  -     Hemoglobin A1c; Future  -     CBC & Differential; Future  -     Lipid Panel; Future    2. Pre-diabetes  -     Semaglutide,0.25 or 0.5MG/DOS, (OZEMPIC) 2 MG/1.5ML solution pen-injector; Inject 0.25 mg under the skin into the appropriate area as directed 1 (One) Time Per Week.  Dispense: 1.5 mL; Refill: 3  -     TSH; Future  -     Comprehensive Metabolic Panel; Future  -     Hemoglobin A1c; Future  -     CBC & Differential;  Future  -     Lipid Panel; Future        Lab Results   Component Value Date    HGBA1C 6.10 (H) 10/05/2021     Discussed ozempic and side effects, he would like to try this     Complete ordered lab work   We will call with results     Please call the office if you have issues with this medication     Increase hydration     Continue to cut back on calories/carbs   Increase exercise           Follow Up   Return in about 4 weeks (around 2/8/2023), or if symptoms worsen or fail to improve, for Next scheduled follow up.  Patient was given instructions and counseling regarding his condition or for health maintenance advice. Please see specific information pulled into the AVS if appropriate.           This document has been electronically signed by MARK Schmidt on January 14, 2023 19:31 CST

## 2023-01-12 ENCOUNTER — DOCUMENTATION (OUTPATIENT)
Dept: FAMILY MEDICINE CLINIC | Facility: CLINIC | Age: 38
End: 2023-01-12
Payer: COMMERCIAL

## 2023-01-12 NOTE — PROGRESS NOTES
Pt was called and notified that his pa for his ozempic was sent and we are waiting for clarification

## 2023-01-26 ENCOUNTER — DOCUMENTATION (OUTPATIENT)
Dept: FAMILY MEDICINE CLINIC | Facility: CLINIC | Age: 38
End: 2023-01-26
Payer: COMMERCIAL

## 2023-01-26 NOTE — PROGRESS NOTES
Pt was denied ozempic because he is not type 2 diabeict the patient was notified and has ask for an alternative provider will be notified today 1-26-23

## 2023-01-30 DIAGNOSIS — E66.09 CLASS 1 OBESITY DUE TO EXCESS CALORIES WITHOUT SERIOUS COMORBIDITY WITH BODY MASS INDEX (BMI) OF 32.0 TO 32.9 IN ADULT: Primary | ICD-10-CM

## 2023-01-30 RX ORDER — PHENTERMINE HYDROCHLORIDE 37.5 MG/1
37.5 CAPSULE ORAL EVERY MORNING
Qty: 30 CAPSULE | Refills: 0 | Status: SHIPPED | OUTPATIENT
Start: 2023-01-30

## 2023-02-08 ENCOUNTER — TELEPHONE (OUTPATIENT)
Dept: FAMILY MEDICINE CLINIC | Facility: CLINIC | Age: 38
End: 2023-02-08
Payer: COMMERCIAL

## 2023-05-17 ENCOUNTER — OFFICE VISIT (OUTPATIENT)
Dept: FAMILY MEDICINE CLINIC | Facility: CLINIC | Age: 38
End: 2023-05-17
Payer: COMMERCIAL

## 2023-05-17 VITALS
DIASTOLIC BLOOD PRESSURE: 78 MMHG | SYSTOLIC BLOOD PRESSURE: 120 MMHG | WEIGHT: 214 LBS | HEIGHT: 69 IN | BODY MASS INDEX: 31.7 KG/M2 | HEART RATE: 64 BPM | OXYGEN SATURATION: 98 %

## 2023-05-17 DIAGNOSIS — M72.2 PLANTAR FASCIITIS: Primary | ICD-10-CM

## 2023-05-17 PROCEDURE — 1159F MED LIST DOCD IN RCRD: CPT | Performed by: NURSE PRACTITIONER

## 2023-05-17 PROCEDURE — 1160F RVW MEDS BY RX/DR IN RCRD: CPT | Performed by: NURSE PRACTITIONER

## 2023-05-17 PROCEDURE — 99214 OFFICE O/P EST MOD 30 MIN: CPT | Performed by: NURSE PRACTITIONER

## 2023-05-17 NOTE — PROGRESS NOTES
"Chief Complaint  Pain (Bilateral arch pain for several months )    Subjective          Lior [facility]per [facility] [place] bilateral heel/foot pain that has been bothering him for a while but now worsening. He has been using otc foot inserts but this is not helping much.   Pain  Chronicity: bilateral feet. The current episode started more than 1 month ago. The problem occurs constantly. The problem has been gradually worsening. Associated symptoms include arthralgias and myalgias. The symptoms are aggravated by walking and standing. He has tried position changes and rest (inserts) for the symptoms. The treatment provided mild relief.     Outpatient Medications Prior to Visit   Medication Sig Dispense Refill   • phentermine 37.5 MG capsule Take 1 capsule by mouth Every Morning. 30 capsule 0   • Semaglutide,0.25 or 0.5MG/DOS, (OZEMPIC) 2 MG/1.5ML solution pen-injector Inject 0.25 mg under the skin into the appropriate area as directed 1 (One) Time Per Week. 1.5 mL 3     No facility-administered medications prior to visit.       Review of Systems   Musculoskeletal: Positive for arthralgias and myalgias.         Objective   Vital Signs:   Visit Vitals  /78   Pulse 64   Ht 175.3 cm (69.02\")   Wt 97.1 kg (214 lb)   SpO2 98%   BMI 31.59 kg/m²     Physical Exam  Vitals and nursing note reviewed.   Constitutional:       Appearance: Normal appearance. He is well-developed.   HENT:      Head: Normocephalic and atraumatic.      Right Ear: Hearing normal.      Left Ear: Hearing normal.      Nose: Nose normal. No mucosal edema or rhinorrhea.   Eyes:      General: Lids are normal.      Conjunctiva/sclera: Conjunctivae normal.      Pupils: Pupils are equal, round, and reactive to light.   Neck:      Thyroid: No thyroid mass or thyromegaly.      Trachea: Trachea normal. No tracheal tenderness or tracheal deviation.   Cardiovascular:      Rate " and Rhythm: Normal rate.      Pulses: Normal pulses.      Heart sounds: Normal heart sounds.   Pulmonary:      Effort: Pulmonary effort is normal. No respiratory distress.      Breath sounds: Normal breath sounds. No stridor. No wheezing or rales.   Abdominal:      Palpations: Abdomen is soft.   Musculoskeletal:         General: Normal range of motion.      Cervical back: Normal range of motion.        Feet:    Feet:      Right foot:      Skin integrity: Skin integrity normal. No erythema.      Left foot:      Skin integrity: Skin integrity normal. No erythema.   Skin:     General: Skin is warm and dry.   Neurological:      Mental Status: He is alert and oriented to person, place, and time.   Psychiatric:         Mood and Affect: Mood is not anxious. Affect is not inappropriate.         Speech: Speech normal.         Behavior: Behavior normal. Behavior is not agitated or hyperactive.         Thought Content: Thought content normal.         Judgment: Judgment normal.        Result Review :                 Assessment and Plan    Diagnoses and all orders for this visit:    1. Plantar fasciitis (Primary)  -     Ambulatory Referral to Podiatry      Education attached to AVS for plantar fascitis     Discussed stretches and at home things to take including otc medication     Referral to Podiatry placed   They will call to schedule       Follow Up   Return in about 3 months (around 8/17/2023), or if symptoms worsen or fail to improve, for Next scheduled follow up.  Patient was given instructions and counseling regarding his condition or for health maintenance advice. Please see specific information pulled into the AVS if appropriate.           This document has been electronically signed by MARK Schmidt on May 19, 2023 12:46 CDT  Answers for HPI/ROS submitted by the patient on 5/11/2023  What is the primary reason for your visit?: Other  Please describe your symptoms.: Foot pain in both heels  Have you had these  symptoms before?: No  How long have you been having these symptoms?: Greater than 2 weeks  Please list any medications you are currently taking for this condition.: None

## 2023-06-06 ENCOUNTER — OFFICE VISIT (OUTPATIENT)
Dept: PODIATRY | Facility: CLINIC | Age: 38
End: 2023-06-06
Payer: COMMERCIAL

## 2023-06-06 VITALS
HEART RATE: 95 BPM | DIASTOLIC BLOOD PRESSURE: 97 MMHG | HEIGHT: 69 IN | SYSTOLIC BLOOD PRESSURE: 149 MMHG | WEIGHT: 214 LBS | BODY MASS INDEX: 31.7 KG/M2 | OXYGEN SATURATION: 97 %

## 2023-06-06 DIAGNOSIS — M79.671 BILATERAL FOOT PAIN: Primary | ICD-10-CM

## 2023-06-06 DIAGNOSIS — M79.672 BILATERAL FOOT PAIN: Primary | ICD-10-CM

## 2023-06-06 DIAGNOSIS — M72.2 PLANTAR FASCIITIS: ICD-10-CM

## 2023-06-06 RX ORDER — DEXAMETHASONE SODIUM PHOSPHATE 4 MG/ML
2 INJECTION, SOLUTION INTRA-ARTICULAR; INTRALESIONAL; INTRAMUSCULAR; INTRAVENOUS; SOFT TISSUE ONCE
Status: COMPLETED | OUTPATIENT
Start: 2023-06-06 | End: 2023-06-06

## 2023-06-06 RX ADMIN — DEXAMETHASONE SODIUM PHOSPHATE 2 MG: 4 INJECTION, SOLUTION INTRA-ARTICULAR; INTRALESIONAL; INTRAMUSCULAR; INTRAVENOUS; SOFT TISSUE at 15:12

## 2023-06-06 RX ADMIN — DEXAMETHASONE SODIUM PHOSPHATE 2 MG: 4 INJECTION, SOLUTION INTRA-ARTICULAR; INTRALESIONAL; INTRAMUSCULAR; INTRAVENOUS; SOFT TISSUE at 15:11

## 2023-06-06 NOTE — PROGRESS NOTES
"Lior Piper  1985  38 y.o. male      06/06/2023    Chief Complaint   Patient presents with   • Left Foot - Pain   • Right Foot - Pain       History of Present Illness    Lior Piper is a 38 y.o.male who presents to clinic today for concern of bilateral heel pain. Xrays obtained today.       History reviewed. No pertinent past medical history.      History reviewed. No pertinent surgical history.      Family History   Problem Relation Age of Onset   • Hypertension Mother    • COPD Mother    • Hypertension Father    • Diabetes Brother    • Diabetes Maternal Grandmother    • Asthma Paternal Grandmother    • Diabetes Paternal Grandmother        No Known Allergies    Social History     Socioeconomic History   • Marital status:    Tobacco Use   • Smoking status: Never   • Smokeless tobacco: Current     Types: Chew   Vaping Use   • Vaping Use: Some days   • Substances: Nicotine   • Devices: Disposable   • Passive vaping exposure: Yes   Substance and Sexual Activity   • Alcohol use: Yes     Alcohol/week: 1.0 standard drink     Types: 1 Shots of liquor per week     Comment: \"one coctail a day\"   • Drug use: Defer   • Sexual activity: Defer         No current outpatient medications on file.     No current facility-administered medications for this visit.       Review of Systems   Constitutional: Negative.    HENT: Negative.     Eyes: Negative.    Respiratory: Negative.     Cardiovascular: Negative.    Gastrointestinal: Negative.    Endocrine: Negative.    Genitourinary: Negative.    Musculoskeletal:         Foot pain     Skin: Negative.    Allergic/Immunologic: Negative.    Neurological: Negative.    Hematological: Negative.    Psychiatric/Behavioral: Negative.         OBJECTIVE    /97   Pulse 95   Ht 175.3 cm (69.02\")   Wt 97.1 kg (214 lb)   SpO2 97%   BMI 31.58 kg/m²     Physical Exam  Vitals reviewed.   Constitutional:       General: He is not in acute distress.     " Appearance: He is well-developed.   Cardiovascular:      Pulses:           Dorsalis pedis pulses are 2+ on the right side and 2+ on the left side.        Posterior tibial pulses are 2+ on the right side and 2+ on the left side.   Pulmonary:      Effort: Pulmonary effort is normal.   Musculoskeletal:      Right lower leg: No edema.      Left lower leg: No edema.   Feet:      Right foot:      Skin integrity: Skin integrity normal.      Left foot:      Skin integrity: Skin integrity normal.      Comments: Pain on palpation to the medial tubercle bilateral calcaneus.  Negative squeeze test.  Skin:     General: Skin is warm and dry.      Capillary Refill: Capillary refill takes less than 2 seconds.   Neurological:      Mental Status: He is alert and oriented to person, place, and time.      Gait: Gait is intact. Gait normal.   Psychiatric:         Behavior: Behavior normal. Behavior is cooperative.         Thought Content: Thought content normal. Thought content is not delusional.              Procedures    Plantar Fasciits Injection  Date/Time: 06/06/2023  Performed by: ANKUSH CHAPIN  Authorized by: ANKUSH CHAPIN   Consent: Verbal consent obtained. Written consent obtained.  Risks and benefits: risks, benefits and alternatives were discussed  Consent given by: patient  Patient identity confirmed: verbally with patient  Indications: pain relief  Nerve block body site: bilateral heel.  Sedation:  Patient sedated: no    Patient position: sitting  Needle size: 27 G  Local anesthetic: 0.5% Marcaine plain, Kenalog 40 mg/ml , Decadron 4 mg/mL.   Outcome: pain improved  Patient tolerance: Patient tolerated the procedure well with no immediate complications     ASSESSMENT AND PLAN    Diagnoses and all orders for this visit:    1. Bilateral foot pain (Primary)  -     XR foot 3+ vw bilateral; Future  -     dexamethasone (DECADRON) injection 2 mg  -     dexamethasone (DECADRON) injection 2 mg  -     triamcinolone acetonide  (KENALOG) injection 10 mg  -     triamcinolone acetonide (KENALOG) injection 10 mg    2. Plantar fasciitis        - Comprehensive foot and ankle exam performed  - X-rays taken and reviewed.  No acute osseous or articular abnormalities.  - Steroid injection bilateral heels  - Patient advised to stretch, ice and to make appropriate shoe gear changes to include wearing athletic type shoes with supportive insoles. Patient was given written instructions on how to correctly perform the stretching of the Achilles tendon/calf stretches, and the heel spur/plantar fasciitis regimen. Limit bare foot walking.    - Recommended over-the-counter insole such as power steps, spenco or walk fit  to properly support the arch in order to alleviate the tension and stress on the plantar fascia associated with normal daily walking. Patient was educated on the break-in period for new arch supports.  - All questions were answered to the patient's satisfaction.  - RTC in 6-8 weeks           This document has been electronically signed by Jefferson Corbin DPM on June 16, 2023 17:20 CDT     6/16/2023  17:20 CDT

## 2023-08-22 ENCOUNTER — OFFICE VISIT (OUTPATIENT)
Dept: FAMILY MEDICINE CLINIC | Facility: CLINIC | Age: 38
End: 2023-08-22
Payer: COMMERCIAL

## 2023-08-22 VITALS
OXYGEN SATURATION: 98 % | DIASTOLIC BLOOD PRESSURE: 88 MMHG | HEART RATE: 92 BPM | SYSTOLIC BLOOD PRESSURE: 148 MMHG | HEIGHT: 69 IN | WEIGHT: 208 LBS | BODY MASS INDEX: 30.81 KG/M2

## 2023-08-22 DIAGNOSIS — R03.0 ELEVATED BLOOD PRESSURE READING: Primary | ICD-10-CM

## 2023-08-22 DIAGNOSIS — G47.33 OSA (OBSTRUCTIVE SLEEP APNEA): ICD-10-CM

## 2023-08-22 PROCEDURE — 99214 OFFICE O/P EST MOD 30 MIN: CPT | Performed by: NURSE PRACTITIONER

## 2023-08-22 RX ORDER — IBUPROFEN 800 MG/1
1 TABLET ORAL 3 TIMES DAILY
COMMUNITY
Start: 2023-07-05

## 2023-08-22 NOTE — PROGRESS NOTES
"Chief Complaint  Follow-up (3 month)    Subjective          Lior Piper presents to [facility] [place] for 3 month follow up. He is still working out, and has been successfully losing weight with diet and exercise.         Hypertension  This is a new problem. The current episode started 1 to 4 weeks ago. The problem has been waxing and waning since onset. Risk factors for coronary artery disease include family history and male gender. Current antihypertension treatment includes lifestyle changes.   Outpatient Medications Prior to Visit   Medication Sig Dispense Refill    ibuprofen (ADVIL,MOTRIN) 800 MG tablet Take 1 tablet by mouth 3 (Three) Times a Day. (Patient not taking: Reported on 8/22/2023)       No facility-administered medications prior to visit.       Review of Systems      Objective   Vital Signs:   Visit Vitals  /88 (BP Location: Left arm, Patient Position: Sitting, Cuff Size: Adult)   Pulse 92   Ht 175.3 cm (69.02\")   Wt 94.3 kg (208 lb)   SpO2 98%   BMI 30.70 kg/mý     Physical Exam  Vitals and nursing note reviewed.   Constitutional:       Appearance: Normal appearance. He is well-developed.   HENT:      Head: Normocephalic and atraumatic.      Right Ear: Hearing normal.      Left Ear: Hearing normal.      Nose: Nose normal. No mucosal edema or rhinorrhea.   Eyes:      General: Lids are normal.      Conjunctiva/sclera: Conjunctivae normal.      Pupils: Pupils are equal, round, and reactive to light.   Neck:      Thyroid: No thyroid mass or thyromegaly.      Trachea: Trachea normal. No tracheal tenderness or tracheal deviation.   Cardiovascular:      Rate and Rhythm: Normal rate.      Pulses: Normal pulses.      Heart sounds: Normal heart sounds.   Pulmonary:      Effort: Pulmonary effort is normal. No respiratory distress.      Breath sounds: Normal breath sounds. No stridor. No wheezing or rales.   Abdominal:      Palpations: Abdomen " is soft.   Musculoskeletal:         General: Normal range of motion.      Cervical back: Normal range of motion.   Skin:     General: Skin is warm and dry.   Neurological:      Mental Status: He is alert and oriented to person, place, and time.   Psychiatric:         Mood and Affect: Mood is not anxious. Affect is not inappropriate.         Speech: Speech normal.         Behavior: Behavior normal. Behavior is not agitated or hyperactive.         Thought Content: Thought content normal.         Judgment: Judgment normal.      Result Review :                 Assessment and Plan    Diagnoses and all orders for this visit:    1. Elevated blood pressure reading (Primary)    2. ANGELA (obstructive sleep apnea)        Complete ordered lab work   We will call with results  Pending lab results we will discuss further treatment plan and monitoring      His SO is a CNA, she is able to check his blood pressure at home, please have her check blood pressure and send me the results. I would prefer manual blood pressure readings since she has training on the how to take a manual BP     Continue to wear Cpap machine at home     Please call the office if you have any issues, questions or concerns       Follow Up   Return if symptoms worsen or fail to improve, for Next scheduled follow up.  Patient was given instructions and counseling regarding his condition or for health maintenance advice. Please see specific information pulled into the AVS if appropriate.           This document has been electronically signed by MARK Schmitd on August 23, 2023 15:13 CDT